# Patient Record
Sex: MALE | Race: BLACK OR AFRICAN AMERICAN | Employment: FULL TIME | URBAN - METROPOLITAN AREA
[De-identification: names, ages, dates, MRNs, and addresses within clinical notes are randomized per-mention and may not be internally consistent; named-entity substitution may affect disease eponyms.]

---

## 2017-11-29 ENCOUNTER — OFFICE VISIT (OUTPATIENT)
Dept: INTERNAL MEDICINE CLINIC | Age: 57
End: 2017-11-29

## 2017-11-29 VITALS
TEMPERATURE: 98.5 F | HEIGHT: 70 IN | WEIGHT: 230.3 LBS | OXYGEN SATURATION: 97 % | HEART RATE: 88 BPM | BODY MASS INDEX: 32.97 KG/M2 | RESPIRATION RATE: 20 BRPM | DIASTOLIC BLOOD PRESSURE: 90 MMHG | SYSTOLIC BLOOD PRESSURE: 176 MMHG

## 2017-11-29 DIAGNOSIS — I63.312 CEREBROVASCULAR ACCIDENT (CVA) DUE TO THROMBOSIS OF LEFT MIDDLE CEREBRAL ARTERY (HCC): ICD-10-CM

## 2017-11-29 DIAGNOSIS — E11.9 TYPE 2 DIABETES MELLITUS WITHOUT COMPLICATION, WITHOUT LONG-TERM CURRENT USE OF INSULIN (HCC): Primary | ICD-10-CM

## 2017-11-29 PROBLEM — I63.9 STROKE (HCC): Status: ACTIVE | Noted: 2017-11-29

## 2017-11-29 RX ORDER — GUAIFENESIN 100 MG/5ML
81 LIQUID (ML) ORAL DAILY
Qty: 100 TAB | Refills: 11 | Status: SHIPPED | OUTPATIENT
Start: 2017-11-29

## 2017-11-29 RX ORDER — METFORMIN HYDROCHLORIDE 500 MG/1
1000 TABLET, EXTENDED RELEASE ORAL 2 TIMES DAILY WITH MEALS
Qty: 120 TAB | Refills: 11 | Status: SHIPPED | OUTPATIENT
Start: 2017-11-29 | End: 2018-12-02 | Stop reason: SDUPTHER

## 2017-11-29 RX ORDER — LISINOPRIL AND HYDROCHLOROTHIAZIDE 10; 12.5 MG/1; MG/1
1 TABLET ORAL DAILY
Qty: 30 TAB | Refills: 11 | Status: SHIPPED | OUTPATIENT
Start: 2017-11-29 | End: 2018-01-04 | Stop reason: DRUGHIGH

## 2017-11-29 NOTE — MR AVS SNAPSHOT
Visit Information Date & Time Provider Department Dept. Phone Encounter #  
 11/29/2017  9:45 AM Darien Wilder MD [facility] Sports Medicine and Primary Care [phone] [id number] Follow-up Instructions Return in about 4 weeks (around 12/27/2017) for bp check, blood sugar check. Follow-up and Disposition History Upcoming Health Maintenance Date Due Hepatitis C Screening 1960 FOOT EXAM Q1 3/7/1968 MICROALBUMIN Q1 3/7/1968 EYE EXAM RETINAL OR DILATED Q1 3/7/1968 Pneumococcal 19-64 Medium Risk (1 of 1 - PPSV23) 3/7/1977 DTaP/Tdap/Td series (1 - Tdap) 3/7/1979 FOBT Q 1 YEAR AGE 50-75 3/7/2008 HEMOGLOBIN A1C Q6M 6/11/2015 LIPID PANEL Q1 12/11/2015 Allergies as of 11/29/2017  Review Complete On: 11/29/2017 By: Darien Wilder MD  
 No Known Allergies Current Immunizations  Never Reviewed No immunizations on file. Not reviewed this visit You Were Diagnosed With   
  
 Codes Comments Type 2 diabetes mellitus without complication, without long-term current use of insulin (HCC)    -  Primary ICD-10-CM: E11.9 ICD-9-CM: 250.00 Cerebrovascular accident (CVA) due to thrombosis of left middle cerebral artery ([facility]     ICD-10-CM: A96.568 ICD-9-CM: 434.01 Vitals BP Pulse Temp Resp Height(growth percentile) Weight(growth percentile) 176/90 88 98.5 °F (36.9 °C) (Oral) 20 5' 10\" (1.778 m) 230 lb 4.8 oz (104.5 kg) SpO2 BMI Smoking Status 97% 33.04 kg/m2 Never Smoker Vitals History BMI and BSA Data Body Mass Index Body Surface Area 33.04 kg/m 2 2.27 m 2 Preferred Pharmacy Pharmacy Name Phone [facility] [address] [facility] [phone] Your Updated Medication List  
  
   
This list is accurate as of: 11/29/17 11:45 AM.  Always use your most recent med list.  
  
  
  
  
 aspirin 81 mg chewable tablet Take 1 Tab by mouth daily. lisinopril-hydroCHLOROthiazide 10-12.5 mg per tablet Commonly known as:  Tyrel Bi Take 1 Tab by mouth daily. metFORMIN  mg tablet Commonly known as:  GLUCOPHAGE XR Take 2 Tabs by mouth two (2) times daily (with meals). Prescriptions Sent to Pharmacy Refills  
 aspirin 81 mg chewable tablet 11 Sig: Take 1 Tab by mouth daily. Class: Normal  
 Pharmacy: [facility] [address] [place] Ph #: [phone] Route: Oral  
 metFORMIN ER (GLUCOPHAGE XR) 500 mg tablet 11 Sig: Take 2 Tabs by mouth two (2) times daily (with meals). Class: Normal  
 Pharmacy: [facility] [address], [address] [place] Ph #: [phone] Route: Oral  
 lisinopril-hydroCHLOROthiazide (PRINZIDE, ZESTORETIC) 10-12.5 mg per tablet 11 Sig: Take 1 Tab by mouth daily. Class: Normal  
 Pharmacy: [facility] [address], [address] [place] Ph #: [phone] Route: Oral  
  
Follow-up Instructions Return in about 4 weeks (around 12/27/2017) for bp check, blood sugar check. Introducing [facility]! Carlo Leiva introduces Ring[employer] patient portal. Now you can access parts of your medical record, email your doctor's office, and request medication refills online. 1. In your internet browser, go to [email or web address]"[employer]. Gauzy/"[employer] 2. Click on the First Time User? Click Here link in the Sign In box. You will see the New Member Sign Up page. 3. Enter your Ringpay Access Code exactly as it appears below. You will not need to use this code after youve completed the sign-up process. If you do not sign up before the expiration date, you must request a new code. · Ring[employer] Access Code: [email or web address] Expires: 2/27/2018 10:52 AM 
 
4. Enter the last four digits of your Social Security Number (xxxx) and Date of Birth (mm/dd/yyyy) as indicated and click Submit. You will be taken to the next sign-up page. 5. Create a Soysuper ID. This will be your So[employer] login ID and cannot be changed, so think of one that is secure and easy to remember. 6. Create a So[employer] password. You can change your password at any time. 7. Enter your Password Reset Question and Answer. This can be used at a later time if you forget your password. 8. Enter your e-mail address. You will receive e-mail notification when new information is available in [address] 9. Click Sign Up. You can now view and download portions of your medical record. 10. Click the Download Summary menu link to download a portable copy of your medical information. If you have questions, please visit the Frequently Asked Questions section of the [employer] website. Remember, [employer] is NOT to be used for urgent needs. For medical emergencies, dial 911. Now available from your iPhone and Android! Please provide this summary of care documentation to your next provider. Your primary care clinician is listed as Barney Emmanuel. If you have any questions after today's visit, please call [phone].

## 2017-11-29 NOTE — PROGRESS NOTES
SPORTS MEDICINE AND PRIMARY CARE  Aylin Rockwell MD, [address] [address]  Phone:  [phone]  Fax: [phone]       Chief Complaint   Patient presents with    Blood sugar problem     f/u   . SUBJECTIVE:    Petra Brito is a 59 y.o. male Patient returns today after a long absence and is seen for evaluation. He has a known history of diabetes that has been uncontrolled, uncontrolled hypertension, and now complains of a month history of loss of balance and left sided weakness. He comes in with his wife of one week duration, Kirill Ansari , who works at [employer] as a DA. Patient is seen for evaluation. Current Outpatient Prescriptions   Medication Sig Dispense Refill    aspirin 81 mg chewable tablet Take 1 Tab by mouth daily. 100 Tab 11    metFORMIN ER (GLUCOPHAGE XR) 500 mg tablet Take 2 Tabs by mouth two (2) times daily (with meals). 120 Tab 11    lisinopril-hydroCHLOROthiazide (PRINZIDE, ZESTORETIC) 10-12.5 mg per tablet Take 1 Tab by mouth daily. 27 Tab 11     Past Medical History:   Diagnosis Date    DM (diabetes mellitus) ([facility]     Preventative health care     Right knee gives way     Scrotal wall abscess     Stroke ([facility] 11/29/2017    l hemiparesis     History reviewed. No pertinent surgical history.   No Known Allergies      REVIEW OF SYSTEMS:  General: negative for - chills or fever  ENT: negative for - headaches, nasal congestion or tinnitus  Respiratory: negative for - cough, hemoptysis, shortness of breath or wheezing  Cardiovascular : negative for - chest pain, edema, palpitations or shortness of breath  Gastrointestinal: negative for - abdominal pain, blood in stools, heartburn or nausea/vomiting  Genito-Urinary: no dysuria, trouble voiding, or hematuria  Musculoskeletal: negative for - gait disturbance, joint pain, joint stiffness or joint swelling  Neurological: no TIA or stroke symptoms  Hematologic: no bruises, no bleeding, no swollen glands  Integument: no lumps, mole changes, nail changes or rash  Endocrine: no malaise/lethargy or unexpected weight changes      Social History     Social History    Marital status:      Spouse name: N/A    Number of children: N/A    Years of education: N/A     Social History Main Topics    Smoking status: Never Smoker    Smokeless tobacco: Never Used    Alcohol use Yes      Comment: occassionally    Drug use: None    Sexual activity: Not Asked     Other Topics Concern    None     Social History Narrative    ** Merged History Encounter **     Habits: There is no history of cigarette abuse, occasional alcohol use. No drug abuse.           Social History:  Patient is  15 - Rachel Rai. He has no children and another person stays with him. 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         Family History:  Father  at 67 related to congestive heart failure. Mother was 76 and complications of diabetes. He has two brothers and one sister who are alive and well. Family History   Problem Relation Age of Onset    Diabetes Mother     Heart Disease Father        OBJECTIVE:    Visit Vitals    /90    Pulse 88    Temp 98.5 °F (36.9 °C) (Oral)    Resp 20    Ht 5' 10\" (1.778 m)    Wt 230 lb 4.8 oz (104.5 kg)    SpO2 97%    BMI 33.04 kg/m2     CONSTITUTIONAL: well , well nourished, appears age appropriate  EYES: perrla, eom intact  ENMT:moist mucous membranes, pharynx clear  NECK: supple. Thyroid normal  RESPIRATORY: Chest: clear bilaterally   CARDIOVASCULAR: Heart: regular rate and rhythm  GASTROINTESTINAL: Abdomen: soft, bowel sounds active  HEMATOLOGIC: no pathological lymph nodes palpated  MUSCULOSKELETAL: Extremities: no edema, pulse 1+   INTEGUMENT: No unusual rashes or suspicious skin lesions noted. Nails appear normal.  NEUROLOGIC: non-focal exam   MENTAL STATUS: alert and oriented, appropriate affect           ASSESSMENT:  1.  Type 2 diabetes mellitus without complication, without long-term current use of insulin ([facility]    2. Cerebrovascular accident (CVA) due to thrombosis of left middle cerebral artery ([facility]      The findings are consistent with acute CVA, probably occurring about a month ago with a left hemiparesis, the reason for his loss of balance. We offer intensive evaluation and hospitalization, all of which he declines. We do suggest an aspirin every day since he doesn't want a workup at this time because of insurance issues. We offer him a referral to [facility], as well as [facility], for evaluation to be accomplished, which he declines likewise. Blood sugar control is lacking. Will add Metformin and gradually titrate the dose to 2,000 mg a day. Will ask him to get the glucometer from [address] [place] [address] and their branded glucometer so it will be more affordable. We suggest he check his blood sugar once a day and to titrate the Metformin, beginning at 500 mg a day and gradually increasing up to 2,000 mg a day to induce a fasting blood sugar of less than 120. His blood pressure control is also lacking. Will start him on Lisinopril/HCTZ 10/12.5 and titrate the medication. He'll return to the office in one month for a blood sugar and blood pressure check, sooner if he has any problems. Again he is advised that if an acute situation occurs he can always go to [facility], and if he prefers he can go to [employer], where there is no charge. PLAN:  .  Orders Placed This Encounter    aspirin 81 mg chewable tablet    metFORMIN ER (GLUCOPHAGE XR) 500 mg tablet    lisinopril-hydroCHLOROthiazide (PRINZIDE, ZESTORETIC) 10-12.5 mg per tablet       Follow-up Disposition:  Return in about 4 weeks (around 12/27/2017) for bp check, blood sugar check. ATTENTION:   This medical record was transcribed using an electronic medical records system. Although proofread, it may and can contain electronic and spelling errors.   Other human spelling and other errors may be present. Corrections may be executed at a later time. Please feel free to contact us for any clarifications as needed.

## 2017-11-29 NOTE — PROGRESS NOTES
1. Have you been to the ER, urgent care clinic since your last visit? Hospitalized since your last visit? Yes When: 11-26-17 Reason for visit: numbness and tingling and elevated BP    2. Have you seen or consulted any other health care providers outside of the [address] [place] [address] since your last visit? Include any pap smears or colon screening. Yes Where: [facility]     Complaints of numbness and tingling in hands and legs. Wants to take diabetic medication.  vertigo

## 2018-01-04 ENCOUNTER — OFFICE VISIT (OUTPATIENT)
Dept: INTERNAL MEDICINE CLINIC | Age: 58
End: 2018-01-04

## 2018-01-04 VITALS
TEMPERATURE: 97.9 F | OXYGEN SATURATION: 95 % | RESPIRATION RATE: 18 BRPM | HEIGHT: 70 IN | SYSTOLIC BLOOD PRESSURE: 164 MMHG | DIASTOLIC BLOOD PRESSURE: 83 MMHG | WEIGHT: 235.3 LBS | HEART RATE: 85 BPM | BODY MASS INDEX: 33.69 KG/M2

## 2018-01-04 DIAGNOSIS — E11.9 TYPE 2 DIABETES MELLITUS WITHOUT COMPLICATION, WITHOUT LONG-TERM CURRENT USE OF INSULIN (HCC): Primary | ICD-10-CM

## 2018-01-04 DIAGNOSIS — I63.312 CEREBROVASCULAR ACCIDENT (CVA) DUE TO THROMBOSIS OF LEFT MIDDLE CEREBRAL ARTERY (HCC): ICD-10-CM

## 2018-01-04 RX ORDER — LISINOPRIL AND HYDROCHLOROTHIAZIDE 12.5; 2 MG/1; MG/1
1 TABLET ORAL DAILY
Qty: 90 TAB | Refills: 11 | Status: SHIPPED | OUTPATIENT
Start: 2018-01-04 | End: 2019-01-08 | Stop reason: SDUPTHER

## 2018-01-04 NOTE — PROGRESS NOTES
SPORTS MEDICINE AND PRIMARY CARE  Abena Art MD, [address] [address]  Phone:  [phone]  Fax: [phone]      Chief Complaint   Patient presents with    Hypertension     f/u         SUBECTIVE:    Jossie Mcmillan is a 59 y.o. male Patient returns today with known history of diabetes, uncontrolled, primary hypertension, CVA with left hemiparesis, seen for evaluation. Patient complains of numbness on the left side, which is the ipsilateral side of the stroke. His blood pressure . His blood sugars have been as low as 53, but usually in the 130-140 range. He's taking 8 units of insulin in the morning, 8 units before supper of the [employer] 70/30 from [place]. His blood pressure has also been running a little higher than we'd like to see it and therefore adjustment will need to be made. Other new complaints denied. Patient is seen for evaluation. Current Outpatient Prescriptions   Medication Sig Dispense Refill    lisinopril-hydroCHLOROthiazide (PRINZIDE, ZESTORETIC) 20-12.5 mg per tablet Take 1 Tab by mouth daily. 90 Tab 11    aspirin 81 mg chewable tablet Take 1 Tab by mouth daily. 100 Tab 11    metFORMIN ER (GLUCOPHAGE XR) 500 mg tablet Take 2 Tabs by mouth two (2) times daily (with meals). 120 Tab 11     Past Medical History:   Diagnosis Date    DM (diabetes mellitus) ([facility]     Preventative health care     Right knee gives way     Scrotal wall abscess     Stroke ([facility] 11/29/2017    l hemiparesis     History reviewed. No pertinent surgical history. No Known Allergies    REVIEW OF SYSTEMS:   No chest pain, no shortness of breath.         Social History     Social History    Marital status:      Spouse name: N/A    Number of children: N/A    Years of education: N/A     Social History Main Topics    Smoking status: Never Smoker    Smokeless tobacco: Never Used    Alcohol use Yes      Comment: occassionally    Drug use: No    Sexual activity: Yes     Partners: Female     Birth control/ protection: None     Other Topics Concern    None     Social History Narrative    ** Merged History Encounter **     Habits: There is no history of cigarette abuse, occasional alcohol use. No drug abuse.           Social History:  Patient is  15 - Golden Lucero. He has no children and another person stays with him. 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         Family History:  Father  at 67 related to congestive heart failure. Mother was 76 and complications of diabetes. He has two brothers and one sister who are alive and well.   r  Family History   Problem Relation Age of Onset    Diabetes Mother     Heart Disease Father        OBJECTIVE:  Visit Vitals    /83    Pulse 85    Temp 97.9 °F (36.6 °C) (Oral)    Resp 18    Ht 5' 10\" (1.778 m)    Wt 235 lb 4.8 oz (106.7 kg)    SpO2 95%    BMI 33.76 kg/m2     ENT: perrla,  eom intact  NECK: supple. Thyroid normal  CHEST: clear to ascultation and percussion   HEART: regular rate and rhythm  ABD: soft, bowel sounds active  EXTREMITIES: no edema, pulse 1+ Foot exam reveals asteatosis. No lesions are noted. Fine filament sensation is intact. Pulses are intact. No visits with results within 3 Month(s) from this visit.   Latest known visit with results is:    Office Visit on 2014   Component Date Value Ref Range Status    Specific Gravity 2014      >=1.030* 1.005 - 1.030 Final    pH (UA) 2014 6.0  5.0 - 7.5 Final    Color 2014 Yellow  Yellow Final    Appearance 2014 Clear  Clear Final    Leukocyte Esterase 2014 Negative  Negative Final    Protein 2014 1+* Negative/Trace Final    Glucose 2014 3+* Negative Final    Ketone 2014 Trace* Negative Final    Blood 2014 Negative  Negative Final    Bilirubin 2014 Negative  Negative Final    Urobilinogen 2014 0.2  0.0 - 1.9 mg/dL Final    Nitrites 2014 Negative  Negative Final    Microscopic Examination 12/11/2014 See additional order   Final    Microscopic was indicated and was performed.  WBC 12/11/2014 4.9  3.4 - 10.8 x10E3/uL Final    RBC 12/11/2014 5.93* 4.14 - 5.80 x10E6/uL Final    HGB 12/11/2014 13.7  12.6 - 17.7 g/dL Final    HCT 12/11/2014 44.6  37.5 - 51.0 % Final    MCV 12/11/2014 75* 79 - 97 fL Final    MCH 12/11/2014 23.1* 26.6 - 33.0 pg Final    MCHC 12/11/2014 30.7* 31.5 - 35.7 g/dL Final    RDW 12/11/2014 16.0* 12.3 - 15.4 % Final    PLATELET 71/10/2985 724  150 - 379 x10E3/uL Final    NEUTROPHILS 12/11/2014 61  % Final    Lymphocytes 12/11/2014 31  % Final    MONOCYTES 12/11/2014 7  % Final    EOSINOPHILS 12/11/2014 1  % Final    BASOPHILS 12/11/2014 0  % Final    ABS. NEUTROPHILS 12/11/2014 3.0  1.4 - 7.0 x10E3/uL Final    Abs Lymphocytes 12/11/2014 1.5  0.7 - 3.1 x10E3/uL Final    ABS. MONOCYTES 12/11/2014 0.3  0.1 - 0.9 x10E3/uL Final    ABS. EOSINOPHILS 12/11/2014 0.1  0.0 - 0.4 x10E3/uL Final    ABS. BASOPHILS 12/11/2014 0.0  0.0 - 0.2 x10E3/uL Final    IMMATURE GRANULOCYTES 12/11/2014 0  % Final    ABS. IMM. GRANS.  12/11/2014 0.0  0.0 - 0.1 x10E3/uL Final    Glucose 12/11/2014 236* 65 - 99 mg/dL Final    BUN 12/11/2014 13  6 - 24 mg/dL Final    Creatinine 12/11/2014 0.99  0.76 - 1.27 mg/dL Final    GFR est non-AA 12/11/2014 85  >59 mL/min/1.73 Final    GFR est AA 12/11/2014 98  >59 mL/min/1.73 Final    BUN/Creatinine ratio 12/11/2014 13  9 - 20 Final    Sodium 12/11/2014 140  134 - 144 mmol/L Final    Potassium 12/11/2014 4.4  3.5 - 5.2 mmol/L Final    Chloride 12/11/2014 98  97 - 108 mmol/L Final    CO2 12/11/2014 21  18 - 29 mmol/L Final    Calcium 12/11/2014 9.9  8.7 - 10.2 mg/dL Final    Protein, total 12/11/2014 7.3  6.0 - 8.5 g/dL Final    Albumin 12/11/2014 4.5  3.5 - 5.5 g/dL Final    GLOBULIN, TOTAL 12/11/2014 2.8  1.5 - 4.5 g/dL Final    A-G Ratio 12/11/2014 1.6  1.1 - 2.5 Final    Bilirubin, total 12/11/2014 0.3  0.0 - 1.2 mg/dL Final    Alk. phosphatase 12/11/2014 75  39 - 117 IU/L Final    AST (SGOT) 12/11/2014 20  0 - 40 IU/L Final    ALT (SGPT) 12/11/2014 36  0 - 44 IU/L Final    Cholesterol, total 12/11/2014 239* 100 - 199 mg/dL Final    Triglyceride 12/11/2014 161* 0 - 149 mg/dL Final    HDL Cholesterol 12/11/2014 55  >39 mg/dL Final    Comment: According to ATP-III Guidelines, HDL-C >59 mg/dL is considered a                           negative risk factor for CHD.  VLDL, calculated 12/11/2014 32  5 - 40 mg/dL Final    LDL, calculated 12/11/2014 152* 0 - 99 mg/dL Final    Prostate Specific Ag 12/11/2014 0.5  0.0 - 4.0 ng/mL Final    Comment: Roche ECLIA methodology. According to the American Urological Association, Serum PSA should                           decrease and remain at undetectable levels after radical                           prostatectomy. The AUA defines biochemical recurrence as an initial                           PSA value 0.2 ng/mL or greater followed by a subsequent confirmatory                           PSA value 0.2 ng/mL or greater. Values obtained with different assay methods or kits cannot be used                           interchangeably. Results cannot be interpreted as absolute evidence                           of the presence or absence of malignant disease.     C-Reactive Protein, Cardiac 12/11/2014 6.98* 0.00 - 3.00 mg/L Final    Comment:          Relative Risk for Future Cardiovascular Event                                                        Low                 <1.00                                                        Average       1.00 - 3.00                                                        High                >3.00    Apolipoprotein B 12/11/2014 126* 0 - 79 mg/dL Final    Hemoglobin A1c 12/11/2014 11.6* 4.8 - 5.6 % Final    Comment:          Increased risk for diabetes: 5.7 - 6.4                                    Diabetes: >6.4                                    Glycemic control for adults with diabetes: <7.0    Aerobic culture 12/11/2014 *  Final                    Value:Beta hemolytic Streptococcus, group B                          Light growth    Comment: Penicillin and ampicillin are drugs of choice for treatment of                           beta-hemolytic streptococcal infections. Susceptibility testing of                           penicillins and other beta-lactam agents approved by the FDA for                           treatment of beta-hemolytic streptococcal infections need not be                           performed routinely because nonsusceptible isolates are extremely                           rare in any beta-hemolytic streptococcus and have not been reported                           for Streptococcus pyogenes (group A). (CLSI 2011)    Aerobic culture 12/11/2014    Final                    Value:Mixed skin chepe                          Moderate growth    WBC 12/11/2014 0-5  0 - 5 /hpf Final    RBC 12/11/2014 0-2  0 - 2 /hpf Final    Epithelial cells 12/11/2014 None seen  0 - 10 /hpf Final    Casts 12/11/2014 Present* None seen /lpf Final    Cast type 12/11/2014 Comment  N/A Final    Hyaline casts    Mucus 12/11/2014 Present  Not Estab. Final    Bacteria 12/11/2014 Few  None seen/Few Final          ASSESSMENT:  1. Type 2 diabetes mellitus without complication, without long-term current use of insulin ([address]    2. Cerebrovascular accident (CVA) due to thrombosis of left middle cerebral artery ([address]      Compared to our last visit I believe we are on the correct pathway. Will increase Lisinopril to 20/12.5 daily. We will see if Victoza will be covered by insurance and if so we will stop the insulin, place him on Victoza and Metformin to keep blood sugar controlled.   If not, he'll increase Metformin to 1,500 mg a day and decrease the insulin to just in the morning. We continue to encourage physical activity, a heart healthy, weight reducing diet. We note that his BMI reflects [place] and New Year's. He'll return to the office in about a month for follow up. We will see him monthly until we have our numbers where we want them to be. PLAN:  .  Orders Placed This Encounter    URINALYSIS W/ RFLX MICROSCOPIC    CBC WITH AUTOMATED DIFF    METABOLIC PANEL, COMPREHENSIVE    LIPID PANEL    PROSTATE SPECIFIC AG    HEMOGLOBIN A1C WITH EAG    lisinopril-hydroCHLOROthiazide (PRINZIDE, ZESTORETIC) 20-12.5 mg per tablet       Follow-up Disposition:  Return in about 4 weeks (around 2/1/2018). ATTENTION:   This medical record was transcribed using an electronic medical records system. Although proofread, it may and can contain electronic and spelling errors. Other human spelling and other errors may be present. Corrections may be executed at a later time. Please feel free to contact us for any clarifications as needed.

## 2018-01-04 NOTE — PROGRESS NOTES
1. Have you been to the ER, urgent care clinic since your last visit? Hospitalized since your last visit? No    2. Have you seen or consulted any other health care providers outside of the [address] [place] [address] since your last visit? Include any pap smears or colon screening.  No     Wants to discuss medication

## 2018-01-04 NOTE — MR AVS SNAPSHOT
Visit Information Date & Time Provider Department Dept. Phone Encounter #  
 1/4/2018 12:45 PM Eddie Rhoades MD [facility] Sports Medicine and Primary Care [phone] [id number] Follow-up Instructions Return in about 4 weeks (around 2/1/2018). Follow-up and Disposition History Upcoming Health Maintenance Date Due Hepatitis C Screening 1960 FOOT EXAM Q1 3/7/1968 MICROALBUMIN Q1 3/7/1968 EYE EXAM RETINAL OR DILATED Q1 3/7/1968 COLONOSCOPY 3/7/1976 DTaP/Tdap/Td series (1 - Tdap) 3/7/1979 HEMOGLOBIN A1C Q6M 6/11/2015 LIPID PANEL Q1 12/11/2015 Allergies as of 1/4/2018  Review Complete On: 1/4/2018 By: Eddie Rhoades MD  
 No Known Allergies Current Immunizations  Never Reviewed No immunizations on file. Not reviewed this visit You Were Diagnosed With   
  
 Codes Comments Type 2 diabetes mellitus without complication, without long-term current use of insulin (HCC)    -  Primary ICD-10-CM: E11.9 ICD-9-CM: 250.00 Cerebrovascular accident (CVA) due to thrombosis of left middle cerebral artery ([facility] [address]     ICD-10-CM: G89.828 ICD-9-CM: 434.01 Vitals BP Pulse Temp Resp Height(growth percentile) Weight(growth percentile) 164/83 85 97.9 °F (36.6 °C) (Oral) 18 5' 10\" (1.778 m) 235 lb 4.8 oz (106.7 kg) SpO2 BMI Smoking Status 95% 33.76 kg/m2 Never Smoker Vitals History BMI and BSA Data Body Mass Index Body Surface Area 33.76 kg/m 2 2.3 m 2 Preferred Pharmacy Pharmacy Name Phone [address], [address] [address] [phone] Your Updated Medication List  
  
   
This list is accurate as of: 1/4/18  2:27 PM.  Always use your most recent med list.  
  
  
  
  
 aspirin 81 mg chewable tablet Take 1 Tab by mouth daily. lisinopril-hydroCHLOROthiazide 20-12.5 mg per tablet Commonly known as:  Kenyetta Common  
 Take 1 Tab by mouth daily. metFORMIN  mg tablet Commonly known as:  GLUCOPHAGE XR Take 2 Tabs by mouth two (2) times daily (with meals). Prescriptions Sent to Pharmacy Refills  
 lisinopril-hydroCHLOROthiazide (PRINZIDE, ZESTORETIC) 20-12.5 mg per tablet 11 Sig: Take 1 Tab by mouth daily. Class: Normal  
 Pharmacy: [address] [facility] [address] [facility] #: [phone] Route: Oral  
  
We Performed the Following CBC WITH AUTOMATED DIFF [26324 CPT(R)] HEMOGLOBIN A1C WITH EAG [82490 CPT(R)] LIPID PANEL [09475 CPT(R)] METABOLIC PANEL, COMPREHENSIVE [16473 CPT(R)] HI COLLECTION VENOUS BLOOD,VENIPUNCTURE D1217302 CPT(R)] PSA, DIAGNOSTIC (PROSTATE SPECIFIC AG) T2245701 CPT(R)] URINALYSIS W/ RFLX MICROSCOPIC [18680 CPT(R)] Follow-up Instructions Return in about 4 weeks (around 2/1/2018). Introducing [facility] & HEALTH SERVICES! [facility] introduces [employer] patient portal. Now you can access parts of your medical record, email your doctor's office, and request medication refills online. 1. In your internet browser, go to [email or web address] 2. Click on the First Time User? Click Here link in the Sign In box. You will see the New Member Sign Up page. 3. Enter your [employer] Access Code exactly as it appears below. You will not need to use this code after youve completed the sign-up process. If you do not sign up before the expiration date, you must request a new code. · [employer] Access Code: [email or web address] Expires: 2/27/2018 10:52 AM 
 
4. Enter the last four digits of your Social Security Number (xxxx) and Date of Birth (mm/dd/yyyy) as indicated and click Submit. You will be taken to the next sign-up page. 5. Create a [employer] ID. This will be your [employer] login ID and cannot be changed, so think of one that is secure and easy to remember. 6. Create a [employer] password. You can change your password at any time. 7. Enter your Password Reset Question and Answer. This can be used at a later time if you forget your password. 8. Enter your e-mail address. You will receive e-mail notification when new information is available in [address] 9. Click Sign Up. You can now view and download portions of your medical record. 10. Click the Download Summary menu link to download a portable copy of your medical information. If you have questions, please visit the Frequently Asked Questions section of the [employer] website. Remember, [employer] is NOT to be used for urgent needs. For medical emergencies, dial 911. Now available from your iPhone and Android! Please provide this summary of care documentation to your next provider. Your primary care clinician is listed as Bogdan Gil. If you have any questions after today's visit, please call [phone].

## 2018-01-05 DIAGNOSIS — I63.312 CEREBROVASCULAR ACCIDENT (CVA) DUE TO THROMBOSIS OF LEFT MIDDLE CEREBRAL ARTERY (HCC): Primary | ICD-10-CM

## 2018-01-05 LAB
ALBUMIN SERPL-MCNC: 4.5 G/DL (ref 3.5–5.5)
ALBUMIN/GLOB SERPL: 1.7 {RATIO} (ref 1.2–2.2)
ALP SERPL-CCNC: 63 IU/L (ref 39–117)
ALT SERPL-CCNC: 25 IU/L (ref 0–44)
APPEARANCE UR: CLEAR
AST SERPL-CCNC: 24 IU/L (ref 0–40)
BASOPHILS # BLD AUTO: 0 X10E3/UL (ref 0–0.2)
BASOPHILS NFR BLD AUTO: 1 %
BILIRUB SERPL-MCNC: 0.2 MG/DL (ref 0–1.2)
BILIRUB UR QL STRIP: NEGATIVE
BUN SERPL-MCNC: 10 MG/DL (ref 6–24)
BUN/CREAT SERPL: 10 (ref 9–20)
CALCIUM SERPL-MCNC: 10.1 MG/DL (ref 8.7–10.2)
CHLORIDE SERPL-SCNC: 97 MMOL/L (ref 96–106)
CHOLEST SERPL-MCNC: 223 MG/DL (ref 100–199)
CO2 SERPL-SCNC: 26 MMOL/L (ref 18–29)
COLOR UR: YELLOW
CREAT SERPL-MCNC: 1.01 MG/DL (ref 0.76–1.27)
EOSINOPHIL # BLD AUTO: 0.1 X10E3/UL (ref 0–0.4)
EOSINOPHIL NFR BLD AUTO: 2 %
ERYTHROCYTE [DISTWIDTH] IN BLOOD BY AUTOMATED COUNT: 16 % (ref 12.3–15.4)
EST. AVERAGE GLUCOSE BLD GHB EST-MCNC: 246 MG/DL
GLOBULIN SER CALC-MCNC: 2.6 G/DL (ref 1.5–4.5)
GLUCOSE SERPL-MCNC: 104 MG/DL (ref 65–99)
GLUCOSE UR QL: NEGATIVE
HBA1C MFR BLD: 10.2 % (ref 4.8–5.6)
HCT VFR BLD AUTO: 41.7 % (ref 37.5–51)
HDLC SERPL-MCNC: 56 MG/DL
HGB BLD-MCNC: 12.7 G/DL (ref 13–17.7)
HGB UR QL STRIP: NEGATIVE
IMM GRANULOCYTES # BLD: 0 X10E3/UL (ref 0–0.1)
IMM GRANULOCYTES NFR BLD: 0 %
KETONES UR QL STRIP: NEGATIVE
LDLC SERPL CALC-MCNC: 117 MG/DL (ref 0–99)
LEUKOCYTE ESTERASE UR QL STRIP: NEGATIVE
LYMPHOCYTES # BLD AUTO: 2.1 X10E3/UL (ref 0.7–3.1)
LYMPHOCYTES NFR BLD AUTO: 35 %
MCH RBC QN AUTO: 22.3 PG (ref 26.6–33)
MCHC RBC AUTO-ENTMCNC: 30.5 G/DL (ref 31.5–35.7)
MCV RBC AUTO: 73 FL (ref 79–97)
MICRO URNS: NORMAL
MONOCYTES # BLD AUTO: 0.5 X10E3/UL (ref 0.1–0.9)
MONOCYTES NFR BLD AUTO: 9 %
NEUTROPHILS # BLD AUTO: 3.2 X10E3/UL (ref 1.4–7)
NEUTROPHILS NFR BLD AUTO: 53 %
NITRITE UR QL STRIP: NEGATIVE
PH UR STRIP: 7 [PH] (ref 5–7.5)
PLATELET # BLD AUTO: 206 X10E3/UL (ref 150–379)
POTASSIUM SERPL-SCNC: 4.4 MMOL/L (ref 3.5–5.2)
PROT SERPL-MCNC: 7.1 G/DL (ref 6–8.5)
PROT UR QL STRIP: NEGATIVE
PSA SERPL-MCNC: 0.6 NG/ML (ref 0–4)
RBC # BLD AUTO: 5.69 X10E6/UL (ref 4.14–5.8)
SODIUM SERPL-SCNC: 143 MMOL/L (ref 134–144)
SP GR UR: 1.01 (ref 1–1.03)
TRIGL SERPL-MCNC: 248 MG/DL (ref 0–149)
UROBILINOGEN UR STRIP-MCNC: 0.2 MG/DL (ref 0.2–1)
VLDLC SERPL CALC-MCNC: 50 MG/DL (ref 5–40)
WBC # BLD AUTO: 6 X10E3/UL (ref 3.4–10.8)

## 2018-01-05 RX ORDER — ATORVASTATIN CALCIUM 40 MG/1
40 TABLET, FILM COATED ORAL DAILY
Qty: 30 TAB | Refills: 11 | Status: SHIPPED | OUTPATIENT
Start: 2018-01-05 | End: 2018-08-06 | Stop reason: SDUPTHER

## 2018-02-01 ENCOUNTER — OFFICE VISIT (OUTPATIENT)
Dept: INTERNAL MEDICINE CLINIC | Age: 58
End: 2018-02-01

## 2018-02-01 VITALS
WEIGHT: 231.7 LBS | HEIGHT: 70 IN | HEART RATE: 86 BPM | OXYGEN SATURATION: 97 % | BODY MASS INDEX: 33.17 KG/M2 | DIASTOLIC BLOOD PRESSURE: 71 MMHG | SYSTOLIC BLOOD PRESSURE: 128 MMHG | TEMPERATURE: 98.2 F | RESPIRATION RATE: 20 BRPM

## 2018-02-01 DIAGNOSIS — E11.9 TYPE 2 DIABETES MELLITUS WITHOUT COMPLICATION, WITHOUT LONG-TERM CURRENT USE OF INSULIN (HCC): Primary | ICD-10-CM

## 2018-02-01 DIAGNOSIS — Z12.11 SCREEN FOR COLON CANCER: ICD-10-CM

## 2018-02-01 DIAGNOSIS — I63.312 CEREBROVASCULAR ACCIDENT (CVA) DUE TO THROMBOSIS OF LEFT MIDDLE CEREBRAL ARTERY (HCC): ICD-10-CM

## 2018-02-01 NOTE — PROGRESS NOTES
1. Have you been to the ER, urgent care clinic since your last visit? Hospitalized since your last visit? No    2. Have you seen or consulted any other health care providers outside of the [address] [place] [address] since your last visit? Include any pap smears or colon screening. No     Complaints of unsteady gait,  Dizziness, and low energy.  Wants to discuss disability

## 2018-02-01 NOTE — PROGRESS NOTES
SPORTS MEDICINE AND PRIMARY CARE  Kenroy Sen MD, [address] [address]  Phone:  [phone]  Fax: [phone]      Chief Complaint   Patient presents with    Diabetes     f/u         SUBECTIVE:    Anali Pedersen is a 59 y.o. male Patient returns today with known history of CVA, dyslipidemia and diabetes and is seen for evaluation. He didn't bring his sugar readings in but tells me his sugars range from 100-140 since we last saw him and his blood pressures are in a normotensive range, however he tells me he doesn't think he can go back to work. He doesn't feel comfortable going back to work, feels off balance he states. Current Outpatient Prescriptions   Medication Sig Dispense Refill    atorvastatin (LIPITOR) 40 mg tablet Take 1 Tab by mouth daily. 30 Tab 11    lisinopril-hydroCHLOROthiazide (PRINZIDE, ZESTORETIC) 20-12.5 mg per tablet Take 1 Tab by mouth daily. 90 Tab 11    aspirin 81 mg chewable tablet Take 1 Tab by mouth daily. 100 Tab 11    metFORMIN ER (GLUCOPHAGE XR) 500 mg tablet Take 2 Tabs by mouth two (2) times daily (with meals). 120 Tab 11     Past Medical History:   Diagnosis Date    DM (diabetes mellitus) ([facility] [address]     Preventative health care     Right knee gives way     Scrotal wall abscess     Stroke ([facility] [address] 11/29/2017    l hemiparesis     History reviewed. No pertinent surgical history. No Known Allergies    REVIEW OF SYSTEMS:   No falls, no chest pain, no shortness of breath.         Social History     Social History    Marital status:      Spouse name: N/A    Number of children: N/A    Years of education: N/A     Social History Main Topics    Smoking status: Never Smoker    Smokeless tobacco: Never Used    Alcohol use Yes      Comment: occassionally    Drug use: No    Sexual activity: Yes     Partners: Female     Birth control/ protection: None     Other Topics Concern    None     Social History Narrative    ** Merged History Encounter **     Habits: There is no history of cigarette abuse, occasional alcohol use. No drug abuse.           Social History:  Patient is  15 - Leeanne Sharma. He has no children and another person stays with him. 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         Family History:  Father  at 67 related to congestive heart failure. Mother was 76 and complications of diabetes. He has two brothers and one sister who are alive and well.   r  Family History   Problem Relation Age of Onset    Diabetes Mother     Heart Disease Father        OBJECTIVE:  Visit Vitals    /71    Pulse 86    Temp 98.2 °F (36.8 °C) (Oral)    Resp 20    Ht 5' 10\" (1.778 m)    Wt 231 lb 11.2 oz (105.1 kg)    SpO2 97%    BMI 33.25 kg/m2     ENT: perrla,  eom intact  NECK: supple. Thyroid normal  CHEST: clear to ascultation and percussion   HEART: regular rate and rhythm  ABD: soft, bowel sounds active  EXTREMITIES: no edema, pulse 1+     Office Visit on 2018   Component Date Value Ref Range Status    Specific Gravity 2018 1.012  1.005 - 1.030 Final    pH (UA) 2018 7.0  5.0 - 7.5 Final    Color 2018 Yellow  Yellow Final    Appearance 2018 Clear  Clear Final    Leukocyte Esterase 2018 Negative  Negative Final    Protein 2018 Negative  Negative/Trace Final    Glucose 2018 Negative  Negative Final    Ketone 2018 Negative  Negative Final    Blood 2018 Negative  Negative Final    Bilirubin 2018 Negative  Negative Final    Urobilinogen 2018 0.2  0.2 - 1.0 mg/dL Final    Nitrites 2018 Negative  Negative Final    Microscopic Examination 2018 Comment   Final    Microscopic not indicated and not performed.     WBC 2018 6.0  3.4 - 10.8 x10E3/uL Final    RBC 2018 5.69  4.14 - 5.80 x10E6/uL Final    HGB 2018 12.7* 13.0 - 17.7 g/dL Final    HCT 2018 41.7  37.5 - 51.0 % Final    MCV 2018 73* 79 - 97 fL Final    MCH 01/04/2018 22.3* 26.6 - 33.0 pg Final    MCHC 01/04/2018 30.5* 31.5 - 35.7 g/dL Final    RDW 01/04/2018 16.0* 12.3 - 15.4 % Final    PLATELET 77/43/4286 503  150 - 379 x10E3/uL Final    NEUTROPHILS 01/04/2018 53  Not Estab. % Final    Lymphocytes 01/04/2018 35  Not Estab. % Final    MONOCYTES 01/04/2018 9  Not Estab. % Final    EOSINOPHILS 01/04/2018 2  Not Estab. % Final    BASOPHILS 01/04/2018 1  Not Estab. % Final    ABS. NEUTROPHILS 01/04/2018 3.2  1.4 - 7.0 x10E3/uL Final    Abs Lymphocytes 01/04/2018 2.1  0.7 - 3.1 x10E3/uL Final    ABS. MONOCYTES 01/04/2018 0.5  0.1 - 0.9 x10E3/uL Final    ABS. EOSINOPHILS 01/04/2018 0.1  0.0 - 0.4 x10E3/uL Final    ABS. BASOPHILS 01/04/2018 0.0  0.0 - 0.2 x10E3/uL Final    IMMATURE GRANULOCYTES 01/04/2018 0  Not Estab. % Final    ABS. IMM. GRANS. 01/04/2018 0.0  0.0 - 0.1 x10E3/uL Final    Glucose 01/04/2018 104* 65 - 99 mg/dL Final    BUN 01/04/2018 10  6 - 24 mg/dL Final    Creatinine 01/04/2018 1.01  0.76 - 1.27 mg/dL Final    GFR est non-AA 01/04/2018 81  >59 mL/min/1.73 Final    GFR est AA 01/04/2018 94  >59 mL/min/1.73 Final    BUN/Creatinine ratio 01/04/2018 10  9 - 20 Final    Sodium 01/04/2018 143  134 - 144 mmol/L Final    Potassium 01/04/2018 4.4  3.5 - 5.2 mmol/L Final    Chloride 01/04/2018 97  96 - 106 mmol/L Final    CO2 01/04/2018 26  18 - 29 mmol/L Final    Calcium 01/04/2018 10.1  8.7 - 10.2 mg/dL Final    Protein, total 01/04/2018 7.1  6.0 - 8.5 g/dL Final    Albumin 01/04/2018 4.5  3.5 - 5.5 g/dL Final    GLOBULIN, TOTAL 01/04/2018 2.6  1.5 - 4.5 g/dL Final    A-G Ratio 01/04/2018 1.7  1.2 - 2.2 Final    Bilirubin, total 01/04/2018 0.2  0.0 - 1.2 mg/dL Final    Alk.  phosphatase 01/04/2018 63  39 - 117 IU/L Final    AST (SGOT) 01/04/2018 24  0 - 40 IU/L Final    ALT (SGPT) 01/04/2018 25  0 - 44 IU/L Final    Cholesterol, total 01/04/2018 223* 100 - 199 mg/dL Final    Triglyceride 01/04/2018 248* 0 - 149 mg/dL Final    HDL Cholesterol 01/04/2018 56  >39 mg/dL Final    VLDL, calculated 01/04/2018 50* 5 - 40 mg/dL Final    LDL, calculated 01/04/2018 117* 0 - 99 mg/dL Final    Prostate Specific Ag 01/04/2018 0.6  0.0 - 4.0 ng/mL Final    Comment: Roche ECLIA methodology. According to the American Urological Association, Serum PSA should  decrease and remain at undetectable levels after radical  prostatectomy. The AUA defines biochemical recurrence as an initial  PSA value 0.2 ng/mL or greater followed by a subsequent confirmatory  PSA value 0.2 ng/mL or greater. Values obtained with different assay methods or kits cannot be used  interchangeably. Results cannot be interpreted as absolute evidence  of the presence or absence of malignant disease.  Hemoglobin A1c 01/04/2018 10.2* 4.8 - 5.6 % Final    Comment:          Pre-diabetes: 5.7 - 6.4           Diabetes: >6.4           Glycemic control for adults with diabetes: <7.0      Estimated average glucose 01/04/2018 246  mg/dL Final          ASSESSMENT:  1. Type 2 diabetes mellitus without complication, without long-term current use of insulin ([address]    2. Cerebrovascular accident (CVA) due to thrombosis of left middle cerebral artery ([facility] [address]    3. Screen for colon cancer      We encouraged him to start walking, maybe doing 15 minutes a day and gradually increase to 30 minutes a day. He initially told me he could at least walk 15 minutes a day. He has a great desire to apply for disability and we suggest he can probably begin physical activity and resume work. However he feels uncomfortable he states and feels off balance. By his history, diabetes control is improved, as well as blood pressure. He'll return to the office in two months. PLAN:  .  Orders Placed This Encounter    HEPATITIS C AB    REFERRAL FOR COLONOSCOPY       Follow-up Disposition:  Return in about 3 months (around 5/1/2018).       ATTENTION:   This medical record was transcribed using an electronic medical records system. Although proofread, it may and can contain electronic and spelling errors. Other human spelling and other errors may be present. Corrections may be executed at a later time. Please feel free to contact us for any clarifications as needed.

## 2018-02-01 NOTE — MR AVS SNAPSHOT
[address] [place] [address]. [address] [id number] 
[phone] Patient: Creola Severs MRN: [id number] TYB:5/6/1999 Visit Information Date & Time Provider Department Dept. Phone Encounter #  
 2/1/2018 12:45 PM Noe Kelly MD [facility] Sports Medicine and Primary Care [phone] [id number] Follow-up Instructions Return in about 3 months (around 5/1/2018). Upcoming Health Maintenance Date Due Hepatitis C Screening 1960 FOOT EXAM Q1 3/7/1968 MICROALBUMIN Q1 3/7/1968 COLONOSCOPY 3/7/1976 HEMOGLOBIN A1C Q6M 7/4/2018 LIPID PANEL Q1 1/4/2019 DTaP/Tdap/Td series (2 - Td) 2/1/2028 Allergies as of 2/1/2018  Review Complete On: 2/1/2018 By: Sanna Bashir LPN No Known Allergies Current Immunizations  Never Reviewed No immunizations on file. Not reviewed this visit You Were Diagnosed With   
  
 Codes Comments Type 2 diabetes mellitus without complication, without long-term current use of insulin (HCC)    -  Primary ICD-10-CM: E11.9 ICD-9-CM: 250.00 Cerebrovascular accident (CVA) due to thrombosis of left middle cerebral artery ([facility] [address]     ICD-10-CM: P89.564 ICD-9-CM: 434.01 Screen for colon cancer     ICD-10-CM: Z12.11 ICD-9-CM: V76.51 Vitals BP Pulse Temp Resp Height(growth percentile) Weight(growth percentile) 151/84 86 98.2 °F (36.8 °C) (Oral) 20 5' 10\" (1.778 m) 231 lb 11.2 oz (105.1 kg) SpO2 BMI Smoking Status 97% 33.25 kg/m2 Never Smoker Vitals History BMI and BSA Data Body Mass Index Body Surface Area  
 33.25 kg/m 2 2.28 m 2 Preferred Pharmacy Pharmacy Name Phone [facility] [address], [address] [phone] Your Updated Medication List  
  
   
This list is accurate as of: 2/1/18  2:56 PM.  Always use your most recent med list.  
  
  
  
  
 aspirin 81 mg chewable tablet Take 1 Tab by mouth daily. atorvastatin 40 mg tablet Commonly known as:  LIPITOR Take 1 Tab by mouth daily. lisinopril-hydroCHLOROthiazide 20-12.5 mg per tablet Commonly known as:  Kerrie Klippel Take 1 Tab by mouth daily. metFORMIN  mg tablet Commonly known as:  GLUCOPHAGE XR Take 2 Tabs by mouth two (2) times daily (with meals). We Performed the Following HEPATITIS C AB [36393 CPT(R)] HM DIABETES FOOT EXAM [HM7 Custom] REFERRAL FOR COLONOSCOPY [UBF030 Custom] Comments:  
 Please evaluate patient for colon. Follow-up Instructions Return in about 3 months (around 5/1/2018). Referral Information Referral ID Referred By Referred To  
  
 [id number] Leonora Morton MD   
   [address] [facility] [address] [place], [place] Phone: [phone] Fax: [phone] Visits Status Start Date End Date 1 New Request 2/1/18 2/1/19 If your referral has a status of pending review or denied, additional information will be sent to support the outcome of this decision. Introducing [facility]! Jermaine Thayer introduces [employer] patient portal. Now you can access parts of your medical record, email your doctor's office, and request medication refills online. 1. In your internet browser, go to [email or web address] 2. Click on the First Time User? Click Here link in the Sign In box. You will see the New Member Sign Up page. 3. Enter your [employer] Access Code exactly as it appears below. You will not need to use this code after youve completed the sign-up process. If you do not sign up before the expiration date, you must request a new code. · [employer] Access Code: [email or web address] Expires: 2/27/2018 10:52 AM 
 
4. Enter the last four digits of your Social Security Number (xxxx) and Date of Birth (mm/dd/yyyy) as indicated and click Submit.  You will be taken to the next sign-up page. 5. Create a [employer] ID. This will be your [employer] login ID and cannot be changed, so think of one that is secure and easy to remember. 6. Create a [employer] password. You can change your password at any time. 7. Enter your Password Reset Question and Answer. This can be used at a later time if you forget your password. 8. Enter your e-mail address. You will receive e-mail notification when new information is available in [address] 9. Click Sign Up. You can now view and download portions of your medical record. 10. Click the Download Summary menu link to download a portable copy of your medical information. If you have questions, please visit the Frequently Asked Questions section of the [employer] website. Remember, [employer] is NOT to be used for urgent needs. For medical emergencies, dial 911. Now available from your iPhone and Android! Please provide this summary of care documentation to your next provider. Your primary care clinician is listed as Maria De Jesus Jose. If you have any questions after today's visit, please call [phone].

## 2018-02-02 LAB — HCV AB S/CO SERPL IA: <0.1 S/CO RATIO (ref 0–0.9)

## 2018-05-10 ENCOUNTER — OFFICE VISIT (OUTPATIENT)
Dept: INTERNAL MEDICINE CLINIC | Age: 58
End: 2018-05-10

## 2018-05-10 VITALS
RESPIRATION RATE: 18 BRPM | SYSTOLIC BLOOD PRESSURE: 129 MMHG | TEMPERATURE: 97.8 F | HEART RATE: 79 BPM | DIASTOLIC BLOOD PRESSURE: 76 MMHG | HEIGHT: 70 IN | WEIGHT: 232.8 LBS | BODY MASS INDEX: 33.33 KG/M2 | OXYGEN SATURATION: 97 %

## 2018-05-10 DIAGNOSIS — Z12.11 SCREEN FOR COLON CANCER: ICD-10-CM

## 2018-05-10 DIAGNOSIS — D64.9 ANEMIA, UNSPECIFIED TYPE: ICD-10-CM

## 2018-05-10 DIAGNOSIS — I63.312 CEREBROVASCULAR ACCIDENT (CVA) DUE TO THROMBOSIS OF LEFT MIDDLE CEREBRAL ARTERY (HCC): ICD-10-CM

## 2018-05-10 DIAGNOSIS — E11.9 TYPE 2 DIABETES MELLITUS WITHOUT COMPLICATION, WITHOUT LONG-TERM CURRENT USE OF INSULIN (HCC): Primary | ICD-10-CM

## 2018-05-10 NOTE — PROGRESS NOTES
SPORTS MEDICINE AND PRIMARY CARE  Cindi Munoz MD, Chelsi Torres[address] [address]  Phone:  [phone]  Fax: [phone]       Chief Complaint   Patient presents with    Diabetes     f/u   . SUBJECTIVE:    Marva Hatch is a 59 y.o. male Patient returns today with known history of primary hypertension, anemia, diabetes, CVA with left hemiparesis, and is seen for evaluation. 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 He was a little anxious at first, but now is back into his groove. Even before the long absence  stroke he had the attitude that \"you never know what's going to happen\". He still has that attitude. Other specific complaints denied. Patient is seen for evaluation. He wonders about his blood sugars being in the low 100s, still on 8 units of insulin. Current Outpatient Prescriptions   Medication Sig Dispense Refill    insulin NPH/insulin regular (NOVOLIN 70/30, HUMULIN 70/30) 100 unit/mL (70-30) injection 8 Units by SubCUTAneous route Daily (before breakfast). 10 mL 11    atorvastatin (LIPITOR) 40 mg tablet Take 1 Tab by mouth daily. 30 Tab 11    lisinopril-hydroCHLOROthiazide (PRINZIDE, ZESTORETIC) 20-12.5 mg per tablet Take 1 Tab by mouth daily. 90 Tab 11    aspirin 81 mg chewable tablet Take 1 Tab by mouth daily. 100 Tab 11    metFORMIN ER (GLUCOPHAGE XR) 500 mg tablet Take 2 Tabs by mouth two (2) times daily (with meals). 120 Tab 11     Past Medical History:   Diagnosis Date    Anemia     DM (diabetes mellitus) ([address]     HTN (hypertension)     Preventative health care     Right knee gives way     Scrotal wall abscess     Stroke ([facility] 11/29/2017    l hemiparesis     History reviewed. No pertinent surgical history.   No Known Allergies      REVIEW OF SYSTEMS:  General: negative for - chills or fever  ENT: negative for - headaches, nasal congestion or tinnitus  Respiratory: negative for - cough, hemoptysis, shortness of breath or wheezing  Cardiovascular : negative for - chest pain, edema, palpitations or shortness of breath  Gastrointestinal: negative for - abdominal pain, blood in stools, heartburn or nausea/vomiting  Genito-Urinary: no dysuria, trouble voiding, or hematuria  Musculoskeletal: negative for - gait disturbance, joint pain, joint stiffness or joint swelling  Neurological: no TIA or stroke symptoms  Hematologic: no bruises, no bleeding, no swollen glands  Integument: no lumps, mole changes, nail changes or rash  Endocrine: no malaise/lethargy or unexpected weight changes      Social History     Social History    Marital status:      Spouse name: N/A    Number of children: N/A    Years of education: N/A     Social History Main Topics    Smoking status: Never Smoker    Smokeless tobacco: Never Used    Alcohol use No      Comment: occassionally    Drug use: No    Sexual activity: Yes     Partners: Female     Birth control/ protection: None     Other Topics Concern    None     Social History Narrative    ** Merged History Encounter **     Habits: There is no history of cigarette abuse, occasional alcohol use. No drug abuse.           Social History:  Patient is  15 - [employer] [employer] [facility]. He has no children and another person stays with him. 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 Mother was 76 and complications of diabetes. He has two brothers and one sister who are alive and well.      Family History   Problem Relation Age of Onset    Diabetes Mother     Heart Disease Father        OBJECTIVE:    Visit Vitals    /79    Pulse 79    Temp 97.8 °F (36.6 °C) (Oral)    Resp 18    Ht 5' 10\" (1.778 m)    Wt 232 lb 12.8 oz (105.6 kg)    SpO2 97%    BMI 33.4 kg/m2     CONSTITUTIONAL: well , well nourished, appears age appropriate  EYES: perrla, eom intact  ENMT:moist mucous membranes, pharynx clear  NECK: supple. Thyroid normal  RESPIRATORY: Chest: clear bilaterally   CARDIOVASCULAR: Heart: regular rate and rhythm  GASTROINTESTINAL: Abdomen: soft, bowel sounds active  HEMATOLOGIC: no pathological lymph nodes palpated  MUSCULOSKELETAL: Extremities: no edema, pulse 1+   INTEGUMENT: No unusual rashes or suspicious skin lesions noted. Nails appear normal.  NEUROLOGIC: non-focal exam   MENTAL STATUS: alert and oriented, appropriate affect           ASSESSMENT:  1. Type 2 diabetes mellitus without complication, without long-term current use of insulin ([address]    2. Cerebrovascular accident (CVA) due to thrombosis of left middle cerebral artery ([address]    3. Anemia, unspecified type    4. Screen for colon cancer      As discussed above patient is concerned about blood sugars being close to 100 and doesn't want them to bottom out. We suggest he look at his Hgb A1c when it is obtained. If it's less than 6.8 he should stop insulin, if it's between 6.8 or above he could decrease the dose by 2 units. Patient agrees with the plan. He continues on Metformin as noted above. We continue to exalt him and congratulate him on the return to work, however since he's returned to work he's stopped his exercise program.  We ask him to get back to his exercise program for at least 30 minutes five days a week. He has typical white coat hypertension. At home his blood pressure this morning was 128/78 compared to the blood pressure today. He agrees to have the colonoscopy. Since he works at [employer] will ask Dr. Romeo Khalil to perform the study. He'll return to the office in three months, sooner if he has any problems. Discussed the patient's BMI with him.   The BMI follow up plan is as follows:     dietary management education, guidance, and counseling  encourage exercise  monitor weight  prescribed dietary intake    An After Visit Summary was printed and given to the patient. PLAN:  .  Orders Placed This Encounter    MICROALBUMIN, UR, RAND W/ MICROALB/CREAT RATIO    CBC WITH AUTOMATED DIFF    LIPID PANEL    HEMOGLOBIN A1C WITH EAG    REFERRAL FOR COLONOSCOPY    insulin NPH/insulin regular (NOVOLIN 70/30, HUMULIN 70/30) 100 unit/mL (70-30) injection       Follow-up Disposition:  Return in about 3 months (around 8/10/2018). ATTENTION:   This medical record was transcribed using an electronic medical records system. Although proofread, it may and can contain electronic and spelling errors. Other human spelling and other errors may be present. Corrections may be executed at a later time. Please feel free to contact us for any clarifications as needed.

## 2018-05-10 NOTE — PROGRESS NOTES
1. Have you been to the ER, urgent care clinic since your last visit? Hospitalized since your last visit? No    2. Have you seen or consulted any other health care providers outside of the [address] [place] [address] since your last visit? Include any pap smears or colon screening.  No       Wants to discuss his BS numbers

## 2018-05-10 NOTE — MR AVS SNAPSHOT
[facility] 
 
 
 [id number] 
[phone] Patient: Lux Canas MRN: [id number] Visit Information Date & Time Provider Department Dept. Phone Encounter #  
 5/10/2018 10:00 AM [facility] [id number] Follow-up Instructions Return in about 3 months (around 8/10/2018). Follow-up and Disposition History Your Appointments 2018 10:45 AM  
Any with Afua Suarez MD  
[address] [place] [address] and Primary Care [address] [place] [address]) Appt Note: 3 MONTH FOLLOW UP  
 [address] [facility]  
  
   
 [id number] Upcoming Health Maintenance Date Due MICROALBUMIN Q1 3/7/1968 COLONOSCOPY 3/7/1976 HEMOGLOBIN A1C Q6M 2018 Influenza Age 5 to Adult 2018 LIPID PANEL Q1 2019 FOOT EXAM Q1 2019 DTaP/Tdap/Td series (2 - Td) 2028 Allergies as of 5/10/2018  Review Complete On: 5/10/2018 By: Afua Suarez MD  
 No Known Allergies Current Immunizations  Never Reviewed No immunizations on file. Not reviewed this visit You Were Diagnosed With   
  
 Codes Comments Type 2 diabetes mellitus without complication, without long-term current use of insulin (HCC)    -  Primary ICD-10-CM: E11.9 ICD-9-CM: 250.00 Cerebrovascular accident (CVA) due to thrombosis of left middle cerebral artery ([facility]     ICD-10-CM: F11.589 ICD-9-CM: 434.01 Anemia, unspecified type     ICD-10-CM: D64.9 ICD-9-CM: 285.9 Screen for colon cancer     ICD-10-CM: Z12.11 ICD-9-CM: V76.51 Vitals BP Pulse Temp Resp Height(growth percentile) Weight(growth percentile) 161/79 79 97.8 °F (36.6 °C) (Oral) 18 5' 10\" (1.778 m) 232 lb 12.8 oz (105.6 kg) SpO2 BMI Smoking Status 97% 33.4 kg/m2 Never Smoker Vitals History BMI and BSA Data Body Mass Index Body Surface Area  
 33.4 kg/m 2 2.28 m 2 Preferred Pharmacy Pharmacy Name Phone [facility], [address] [facility] [phone] Your Updated Medication List  
  
   
This list is accurate as of 5/10/18 12:02 PM.  Always use your most recent med list.  
  
  
  
  
 aspirin 81 mg chewable tablet Take 1 Tab by mouth daily. atorvastatin 40 mg tablet Commonly known as:  LIPITOR Take 1 Tab by mouth daily. insulin NPH/insulin regular 100 unit/mL (70-30) injection Commonly known as:  NOVOLIN 70/30, HUMULIN 70/30  
8 Units by SubCUTAneous route Daily (before breakfast). lisinopril-hydroCHLOROthiazide 20-12.5 mg per tablet Commonly known as:  Giancarlo Lash Take 1 Tab by mouth daily. metFORMIN  mg tablet Commonly known as:  GLUCOPHAGE XR Take 2 Tabs by mouth two (2) times daily (with meals). We Performed the Following CBC WITH AUTOMATED DIFF [86697 CPT(R)] COLLECTION VENOUS BLOOD,VENIPUNCTURE Z9313235 CPT(R)] HEMOGLOBIN A1C WITH EAG [81459 CPT(R)] LIPID PANEL [64077 CPT(R)] MICROALBUMIN, UR, RAND W/ MICROALB/CREAT RATIO [id number] CPT(R)] REFERRAL FOR COLONOSCOPY [UFA994 Custom] Comments:  
 Please evaluate patient for colon. Follow-up Instructions Return in about 3 months (around 8/10/2018). Referral Information Referral ID Referred By Referred To  
  
 [id number] Carmina Luciano MD   
   [address] [place] [address] Phone: [phone] Fax: [phone] Visits Status Start Date End Date 1 New Request 5/10/18 5/10/19 If your referral has a status of pending review or denied, additional information will be sent to support the outcome of this decision. Patient Instructions Body Mass Index: Care Instructions Your Care Instructions Body mass index (BMI) can help you see if your weight is raising your risk for health problems. It uses a formula to compare how much you weigh with how tall you are. · A BMI lower than 18.5 is considered underweight. · A BMI between 18.5 and 24.9 is considered healthy. · A BMI between 25 and 29.9 is considered overweight. A BMI of 30 or higher is considered obese. If your BMI is in the normal range, it means that you have a lower risk for weight-related health problems. If your BMI is in the overweight or obese range, you may be at increased risk for weight-related health problems, such as high blood pressure, heart disease, stroke, arthritis or joint pain, and diabetes. If your BMI is in the underweight range, you may be at increased risk for health problems such as fatigue, lower protection (immunity) against illness, muscle loss, bone loss, hair loss, and hormone problems. BMI is just one measure of your risk for weight-related health problems. You may be at higher risk for health problems if you are not active, you eat an unhealthy diet, or you drink too much alcohol or use tobacco products. Follow-up care is a key part of your treatment and safety. Be sure to make and go to all appointments, and call your doctor if you are having problems. It's also a good idea to know your test results and keep a list of the medicines you take. How can you care for yourself at home? · Practice healthy eating habits. This includes eating plenty of fruits, vegetables, whole grains, lean protein, and low-fat dairy. · If your doctor recommends it, get more exercise. Walking is a good choice. Bit by bit, increase the amount you walk every day. Try for at least 30 minutes on most days of the week. · Do not smoke. Smoking can increase your risk for health problems.  If you need help quitting, talk to your doctor about stop-smoking programs and medicines. These can increase your chances of quitting for good. · Limit alcohol to 2 drinks a day for men and 1 drink a day for women. Too much alcohol can cause health problems. If you have a BMI higher than 25 · Your doctor may do other tests to check your risk for weight-related health problems. This may include measuring the distance around your waist. A waist measurement of more than 40 inches in men or 35 inches in women can increase the risk of weight-related health problems. · Talk with your doctor about steps you can take to stay healthy or improve your health. You may need to make lifestyle changes to lose weight and stay healthy, such as changing your diet and getting regular exercise. If you have a BMI lower than 18.5 · Your doctor may do other tests to check your risk for health problems. · Talk with your doctor about steps you can take to stay healthy or improve your health. You may need to make lifestyle changes to gain or maintain weight and stay healthy, such as getting more healthy foods in your diet and doing exercises to build muscle. Where can you learn more? Go to http://jhonny-amari.info/. Enter S176 in the search box to learn more about \"Body Mass Index: Care Instructions. \" Current as of: October 13, 2016 Content Version: 11.4 © 4892-5865 Healthwise, Incorporated. Care instructions adapted under license by [employer] (which disclaims liability or warranty for this information). If you have questions about a medical condition or this instruction, always ask your healthcare professional. [address] any warranty or liability for your use of this information. Introducing \[facility] [place] [facility]\"" & HEALTH SERVICES! Judy Ca introduces [employer]. patient portal. Now you can access parts of your medical record, email your doctor's office, and request medication refills online.    
 
1. In your internet browser, go to [email or web address] 2. Click on the First Time User? Click Here link in the Sign In box. You will see the New Member Sign Up page. 3. Enter your [employer] Access Code exactly as it appears below. You will not need to use this code after youve completed the sign-up process. If you do not sign up before the expiration date, you must request a new code. · [employer] Access Code: [email or web address] Expires: 8/8/2018 11:00 AM 
 
4. Enter the last four digits of your Social Security Number (xxxx) and Date of Birth (mm/dd/yyyy) as indicated and click Submit. You will be taken to the next sign-up page. 5. Create a [employer] ID. This will be your [employer] login ID and cannot be changed, so think of one that is secure and easy to remember. 6. Create a [employer] password. You can change your password at any time. 7. Enter your Password Reset Question and Answer. This can be used at a later time if you forget your password. 8. Enter your e-mail address. You will receive e-mail notification when new information is available in [address] 9. Click Sign Up. You can now view and download portions of your medical record. 10. Click the Download Summary menu link to download a portable copy of your medical information. If you have questions, please visit the Frequently Asked Questions section of the [employer] website. Remember, [employer] is NOT to be used for urgent needs. For medical emergencies, dial 911. Now available from your iPhone and Android! Please provide this summary of care documentation to your next provider. Your primary care clinician is listed as Gurinder Payne. If you have any questions after today's visit, please call [phone].

## 2018-05-10 NOTE — PATIENT INSTRUCTIONS
Body Mass Index: Care Instructions  Your Care Instructions    Body mass index (BMI) can help you see if your weight is raising your risk for health problems. It uses a formula to compare how much you weigh with how tall you are. · A BMI lower than 18.5 is considered underweight. · A BMI between 18.5 and 24.9 is considered healthy. · A BMI between 25 and 29.9 is considered overweight. A BMI of 30 or higher is considered obese. If your BMI is in the normal range, it means that you have a lower risk for weight-related health problems. If your BMI is in the overweight or obese range, you may be at increased risk for weight-related health problems, such as high blood pressure, heart disease, stroke, arthritis or joint pain, and diabetes. If your BMI is in the underweight range, you may be at increased risk for health problems such as fatigue, lower protection (immunity) against illness, muscle loss, bone loss, hair loss, and hormone problems. BMI is just one measure of your risk for weight-related health problems. You may be at higher risk for health problems if you are not active, you eat an unhealthy diet, or you drink too much alcohol or use tobacco products. Follow-up care is a key part of your treatment and safety. Be sure to make and go to all appointments, and call your doctor if you are having problems. It's also a good idea to know your test results and keep a list of the medicines you take. How can you care for yourself at home? · Practice healthy eating habits. This includes eating plenty of fruits, vegetables, whole grains, lean protein, and low-fat dairy. · If your doctor recommends it, get more exercise. Walking is a good choice. Bit by bit, increase the amount you walk every day. Try for at least 30 minutes on most days of the week. · Do not smoke. Smoking can increase your risk for health problems. If you need help quitting, talk to your doctor about stop-smoking programs and medicines. These can increase your chances of quitting for good. · Limit alcohol to 2 drinks a day for men and 1 drink a day for women. Too much alcohol can cause health problems. If you have a BMI higher than 25  · Your doctor may do other tests to check your risk for weight-related health problems. This may include measuring the distance around your waist. A waist measurement of more than 40 inches in men or 35 inches in women can increase the risk of weight-related health problems. · Talk with your doctor about steps you can take to stay healthy or improve your health. You may need to make lifestyle changes to lose weight and stay healthy, such as changing your diet and getting regular exercise. If you have a BMI lower than 18.5  · Your doctor may do other tests to check your risk for health problems. · Talk with your doctor about steps you can take to stay healthy or improve your health. You may need to make lifestyle changes to gain or maintain weight and stay healthy, such as getting more healthy foods in your diet and doing exercises to build muscle. Where can you learn more? Go to http://jhonny-amari.info/. Enter S176 in the search box to learn more about \"Body Mass Index: Care Instructions. \"  Current as of: October 13, 2016  Content Version: 11.4  © 6181-6091 Healthwise, Incorporated. Care instructions adapted under license by [employer] (which disclaims liability or warranty for this information). If you have questions about a medical condition or this instruction, always ask your healthcare professional. [address] any warranty or liability for your use of this information.

## 2018-05-11 LAB
ALBUMIN/CREAT UR: 51.2 MG/G CREAT (ref 0–30)
BASOPHILS # BLD AUTO: 0 X10E3/UL (ref 0–0.2)
BASOPHILS NFR BLD AUTO: 0 %
CHOLEST SERPL-MCNC: 127 MG/DL (ref 100–199)
CREAT UR-MCNC: 112.4 MG/DL
EOSINOPHIL # BLD AUTO: 0.1 X10E3/UL (ref 0–0.4)
EOSINOPHIL NFR BLD AUTO: 2 %
ERYTHROCYTE [DISTWIDTH] IN BLOOD BY AUTOMATED COUNT: 16.4 % (ref 12.3–15.4)
EST. AVERAGE GLUCOSE BLD GHB EST-MCNC: 163 MG/DL
HBA1C MFR BLD: 7.3 % (ref 4.8–5.6)
HCT VFR BLD AUTO: 40.3 % (ref 37.5–51)
HDLC SERPL-MCNC: 53 MG/DL
HGB BLD-MCNC: 12.4 G/DL (ref 13–17.7)
IMM GRANULOCYTES # BLD: 0 X10E3/UL (ref 0–0.1)
IMM GRANULOCYTES NFR BLD: 0 %
LDLC SERPL CALC-MCNC: 43 MG/DL (ref 0–99)
LYMPHOCYTES # BLD AUTO: 1.9 X10E3/UL (ref 0.7–3.1)
LYMPHOCYTES NFR BLD AUTO: 31 %
MCH RBC QN AUTO: 22.1 PG (ref 26.6–33)
MCHC RBC AUTO-ENTMCNC: 30.8 G/DL (ref 31.5–35.7)
MCV RBC AUTO: 72 FL (ref 79–97)
MICROALBUMIN UR-MCNC: 57.6 UG/ML
MONOCYTES # BLD AUTO: 0.6 X10E3/UL (ref 0.1–0.9)
MONOCYTES NFR BLD AUTO: 10 %
NEUTROPHILS # BLD AUTO: 3.6 X10E3/UL (ref 1.4–7)
NEUTROPHILS NFR BLD AUTO: 57 %
PLATELET # BLD AUTO: 227 X10E3/UL (ref 150–379)
RBC # BLD AUTO: 5.61 X10E6/UL (ref 4.14–5.8)
TRIGL SERPL-MCNC: 157 MG/DL (ref 0–149)
VLDLC SERPL CALC-MCNC: 31 MG/DL (ref 5–40)
WBC # BLD AUTO: 6.3 X10E3/UL (ref 3.4–10.8)

## 2018-08-06 RX ORDER — ATORVASTATIN CALCIUM 40 MG/1
40 TABLET, FILM COATED ORAL DAILY
Qty: 90 TAB | Refills: 3 | Status: SHIPPED | OUTPATIENT
Start: 2018-08-06 | End: 2019-09-04 | Stop reason: SDUPTHER

## 2018-08-16 ENCOUNTER — OFFICE VISIT (OUTPATIENT)
Dept: INTERNAL MEDICINE CLINIC | Age: 58
End: 2018-08-16

## 2018-08-16 VITALS
TEMPERATURE: 97.4 F | SYSTOLIC BLOOD PRESSURE: 135 MMHG | HEART RATE: 87 BPM | HEIGHT: 70 IN | BODY MASS INDEX: 32.77 KG/M2 | OXYGEN SATURATION: 98 % | DIASTOLIC BLOOD PRESSURE: 77 MMHG | RESPIRATION RATE: 18 BRPM | WEIGHT: 228.9 LBS

## 2018-08-16 DIAGNOSIS — I63.312 CEREBROVASCULAR ACCIDENT (CVA) DUE TO THROMBOSIS OF LEFT MIDDLE CEREBRAL ARTERY (HCC): ICD-10-CM

## 2018-08-16 DIAGNOSIS — E11.21 TYPE 2 DIABETES WITH NEPHROPATHY (HCC): Primary | ICD-10-CM

## 2018-08-16 DIAGNOSIS — D64.9 ANEMIA, UNSPECIFIED TYPE: ICD-10-CM

## 2018-08-16 DIAGNOSIS — I10 ESSENTIAL HYPERTENSION: ICD-10-CM

## 2018-08-16 RX ORDER — SILDENAFIL 100 MG/1
100 TABLET, FILM COATED ORAL AS NEEDED
Qty: 10 TAB | Refills: 11 | Status: SHIPPED | OUTPATIENT
Start: 2018-08-16 | End: 2019-09-15 | Stop reason: SDUPTHER

## 2018-08-16 NOTE — PROGRESS NOTES
SPORTS MEDICINE AND PRIMARY CARE  Jewell Bang MD, [address] [address]  Phone:  [phone]  Fax: [phone]       Chief Complaint   Patient presents with    Diabetes   . SUBJECTIVE:    Chantelle Martino is a 59 y.o. male  Dictation on: 08/16/2018 12:01 PM by: Maeve Adkins      Dictation on: 08/16/2018 12:04 PM by: Maeve Adkins            Current Outpatient Prescriptions   Medication Sig Dispense Refill    sildenafil citrate (VIAGRA) 100 mg tablet Take 1 Tab by mouth as needed. 10 Tab 11    atorvastatin (LIPITOR) 40 mg tablet Take 1 Tab by mouth daily. 90 Tab 3    insulin NPH/insulin regular (NOVOLIN 70/30, HUMULIN 70/30) 100 unit/mL (70-30) injection 8 Units by SubCUTAneous route Daily (before breakfast). 10 mL 11    lisinopril-hydroCHLOROthiazide (PRINZIDE, ZESTORETIC) 20-12.5 mg per tablet Take 1 Tab by mouth daily. 90 Tab 11    aspirin 81 mg chewable tablet Take 1 Tab by mouth daily. 100 Tab 11    metFORMIN ER (GLUCOPHAGE XR) 500 mg tablet Take 2 Tabs by mouth two (2) times daily (with meals). 120 Tab 11     Past Medical History:   Diagnosis Date    Anemia     DM [facility]diabetes mellitus) [facility]     HTN (hypertension)     Preventative health care     Right knee gives way     Scrotal wall abscess     Stroke ([facility] 11/29/2017    l hemiparesis     History reviewed. No pertinent surgical history.   No Known Allergies      REVIEW OF SYSTEMS:  General: negative for - chills or fever  ENT: negative for - headaches, nasal congestion or tinnitus  Respiratory: negative for - cough, hemoptysis, shortness of breath or wheezing  Cardiovascular : negative for - chest pain, edema, palpitations or shortness of breath  Gastrointestinal: negative for - abdominal pain, blood in stools, heartburn or nausea/vomiting  Genito-Urinary: no dysuria, trouble voiding, or hematuria  Musculoskeletal: negative for - gait disturbance, joint pain, joint stiffness or joint swelling  Neurological: no TIA or stroke symptoms  Hematologic: no bruises, no bleeding, no swollen glands  Integument: no lumps, mole changes, nail changes or rash  Endocrine: no malaise/lethargy or unexpected weight changes      Social History     Social History    Marital status:      Spouse name: N/A    Number of children: N/A    Years of education: N/A     Social History Main Topics    Smoking status: Never Smoker    Smokeless tobacco: Never Used    Alcohol use No      Comment: occassionally    Drug use: No    Sexual activity: Yes     Partners: Female     Birth control/ protection: None     Other Topics Concern    None     Social History Narrative    ** Merged History Encounter **     Habits: There is no history of cigarette abuse, occasional alcohol use. No drug abuse.           Social History:  Patient is  15 - Anna German. He has no children and another person stays with him. Patient is an LPN in Psychiatry at [address]         Family History:  Father  at 67 related to congestive heart failure. Mother was 76 and complications of diabetes. He has two brothers and one sister who are alive and well. Family History   Problem Relation Age of Onset    Diabetes Mother     Heart Disease Father        OBJECTIVE:    Visit Vitals    /77    Pulse 87    Temp 97.4 °F (36.3 °C) (Oral)    Resp 18    Ht 5' 10\" (1.778 m)    Wt 228 lb 14.4 oz (103.8 kg)    SpO2 98%    BMI 32.84 kg/m2     CONSTITUTIONAL: well , well nourished, appears age appropriate  EYES: perrla, eom intact  ENMT:moist mucous membranes, pharynx clear  NECK: supple. Thyroid normal  RESPIRATORY: Chest: clear bilaterally   CARDIOVASCULAR: Heart: regular rate and rhythm  GASTROINTESTINAL: Abdomen: soft, bowel sounds active  HEMATOLOGIC: no pathological lymph nodes palpated  MUSCULOSKELETAL: Extremities: no edema, pulse 1+   INTEGUMENT: No unusual rashes or suspicious skin lesions noted. Nails appear normal.  NEUROLOGIC: non-focal exam   MENTAL STATUS: alert and oriented, appropriate affect           ASSESSMENT:  1. Type 2 diabetes with nephropathy ([facility]    2. Cerebrovascular accident (CVA) due to thrombosis of left middle cerebral artery ([facility]    3. Anemia, unspecified type    4. Essential hypertension       Dictation on: 08/16/2018 12:07 PM by: Birgit Bingham       I have discussed the diagnosis with the patient and the intended plan as seen in the  orders above. The patient understands and agees with the plan. The patient has   received an after visit summary and questions were answered concerning  future plans  Patient labs and/or xrays were reviewed  Past records were reviewed. PLAN:  .  Orders Placed This Encounter    HEMOGLOBIN A1C WITH EAG    CBC WITH AUTOMATED DIFF    HEMOGLOBIN FRACTIONATION    sildenafil citrate (VIAGRA) 100 mg tablet       Follow-up Disposition:  Return in about 3 months (around 11/16/2018). ATTENTION:   This medical record was transcribed using an electronic medical records system. Although proofread, it may and can contain electronic and spelling errors. Other human spelling and other errors may be present. Corrections may be executed at a later time. Please feel free to contact us for any clarifications as needed.

## 2018-08-16 NOTE — PROGRESS NOTES
1. Have you been to the ER, urgent care clinic since your last visit? Hospitalized since your last visit? No    2. Have you seen or consulted any other health care providers outside of the [address] [place] [address] since your last visit? Include any pap smears or colon screening.  No     Wants discuss lab results and ED  Needs to be scheduled for colonoscopy

## 2018-08-16 NOTE — MR AVS SNAPSHOT
Jasvir Sparrow 
 
 
 [id number] 
[phone] Patient: Imelda De Jesus MRN: [id number] AGY:[birth year] Visit Information Date & Time Provider Department Dept. Phone Encounter #  
 8/16/2018 10:45 AM Lesly Franco MD [facility] Sports Medicine and Primary Care [phone] [id number] Follow-up Instructions Return in about 3 months (around 11/16/2018). Follow-up and Disposition History Your Appointments 12/13/2018 10:45 AM  
Any with Lesly Franco MD  
[address] [place] [address] and Primary Care [address] [place] [address]) Appt Note: 4 month follow up  
 [address] [facility]  
  
   
 [id number] Upcoming Health Maintenance Date Due COLONOSCOPY 3/7/1976 Influenza Age 5 to Adult 9/16/2018* HEMOGLOBIN A1C Q6M 11/10/2018 FOOT EXAM Q1 2/1/2019 MICROALBUMIN Q1 5/10/2019 LIPID PANEL Q1 5/10/2019 DTaP/Tdap/Td series (2 - Td) 2/1/2028 *Topic was postponed. The date shown is not the original due date. Allergies as of 8/16/2018  Review Complete On: 8/16/2018 By: Gabo May LPN No Known Allergies Current Immunizations  Never Reviewed No immunizations on file. Not reviewed this visit You Were Diagnosed With   
  
 Codes Comments Type 2 diabetes with nephropathy (HCC)    -  Primary ICD-10-CM: E11.21 
ICD-9-CM: 250.40, 583.81 Cerebrovascular accident (CVA) due to thrombosis of left middle cerebral artery ([facility]     ICD-10-CM: C69.942 ICD-9-CM: 434.01 Anemia, unspecified type     ICD-10-CM: D64.9 ICD-9-CM: 285.9 Essential hypertension     ICD-10-CM: I10 
ICD-9-CM: 401.9 Vitals BP Pulse Temp Resp Height(growth percentile) Weight(growth percentile) 135/77 87 97.4 °F (36.3 °C) (Oral) 18 5' 10\" (1.778 m) 228 lb 14.4 oz (103.8 kg) SpO2 BMI Smoking Status 98% 32.84 kg/m2 Never Smoker Vitals History BMI and BSA Data Body Mass Index Body Surface Area  
 32.84 kg/m 2 2.26 m 2 Preferred Pharmacy Pharmacy Name Phone [address] On license of [facility], [address] [facility] [phone] Your Updated Medication List  
  
   
This list is accurate as of 8/16/18 12:25 PM.  Always use your most recent med list.  
  
  
  
  
 aspirin 81 mg chewable tablet Take 1 Tab by mouth daily. atorvastatin 40 mg tablet Commonly known as:  LIPITOR Take 1 Tab by mouth daily. insulin NPH/insulin regular 100 unit/mL (70-30) injection Commonly known as:  NOVOLIN 70/30, HUMULIN 70/30  
8 Units by SubCUTAneous route Daily (before breakfast). lisinopril-hydroCHLOROthiazide 20-12.5 mg per tablet Commonly known as:  Conda Franc Take 1 Tab by mouth daily. metFORMIN  mg tablet Commonly known as:  GLUCOPHAGE XR Take 2 Tabs by mouth two (2) times daily (with meals). sildenafil citrate 100 mg tablet Commonly known as:  VIAGRA Take 1 Tab by mouth as needed. Prescriptions Printed Refills  
 sildenafil citrate (VIAGRA) 100 mg tablet 11 Sig: Take 1 Tab by mouth as needed. Class: Print Route: Oral  
  
We Performed the Following CBC WITH AUTOMATED DIFF [97493 CPT(R)] COLLECTION VENOUS BLOOD,VENIPUNCTURE [id number] CPT(R)] HEMOGLOBIN A1C WITH EAG [61108 CPT(R)] HEMOGLOBIN FRACTIONATION [[id number] Custom] Follow-up Instructions Return in about 3 months (around 11/16/2018). Introducing [facility]! [employer] introduces [employer] patient portal. Now you can access parts of your medical record, email your doctor's office, and request medication refills online. 1. In your internet browser, go to [email or web address] 2. Click on the First Time User? Click Here link in the Sign In box.  You will see the New Member Sign Up page. 3. Enter your 10X10 Room Access Code exactly as it appears below. You will not need to use this code after youve completed the sign-up process. If you do not sign up before the expiration date, you must request a new code. · 10X10 Room Access Code: [email or web address] Expires: 11/14/2018 12:25 PM 
 
4. Enter the last four digits of your Social Security Number (xxxx) and Date of Birth (mm/dd/yyyy) as indicated and click Submit. You will be taken to the next sign-up page. 5. Create a [employer] ID. This will be your 10X10 Room login ID and cannot be changed, so think of one that is secure and easy to remember. 6. Create a 10X10 Room password. You can change your password at any time. 7. Enter your Password Reset Question and Answer. This can be used at a later time if you forget your password. 8. Enter your e-mail address. You will receive e-mail notification when new information is available in [address] 9. Click Sign Up. You can now view and download portions of your medical record. 10. Click the Download Summary menu link to download a portable copy of your medical information. If you have questions, please visit the Frequently Asked Questions section of the 10X10 Room website. Remember, 10X10 Room is NOT to be used for urgent needs. For medical emergencies, dial 911. Now available from your iPhone and Android! Please provide this summary of care documentation to your next provider. Your primary care clinician is listed as Eclectic Sprinkles. If you have any questions after today's visit, please call [phone].

## 2018-08-19 NOTE — PROGRESS NOTES
Clinically, patient's mental status is stable. Blood pressure  135/77 today. We advised him that we would like to see it the  majority of the time less than 130/80. I discussed with him thathis blood sugar readings are completely  acceptable. His BMI reflects obesity at 32.84, and we encouraged physical  activity and a heart-healthy weight-reducing diet. We will reschedule colonoscopy. He will return to the office in  3 to 4 months, sooner if he has any problems. He was advised he  can walk in to see us at any time should he have an urgency and  unable to get an appointment.

## 2018-08-19 NOTE — PROGRESS NOTES
The patient returns today stating he has been trying to schedule  the colonoscopy and has not been successful. His blood sugars range from a low of 63 mostly between 110 and  130. Other new complaints denied. The patient is seen for  evaluation.

## 2018-08-19 NOTE — PROGRESS NOTES
The patient returns today with known history of diabetes  mellitus, type 2, hemoglobin A1c was over 7; anemia;  hypertension; and CVA with left hemiparesis.

## 2018-08-20 LAB
BASOPHILS # BLD AUTO: 0 X10E3/UL (ref 0–0.2)
BASOPHILS NFR BLD AUTO: 0 %
EOSINOPHIL # BLD AUTO: 0.1 X10E3/UL (ref 0–0.4)
EOSINOPHIL NFR BLD AUTO: 2 %
ERYTHROCYTE [DISTWIDTH] IN BLOOD BY AUTOMATED COUNT: 16.8 % (ref 12.3–15.4)
EST. AVERAGE GLUCOSE BLD GHB EST-MCNC: 169 MG/DL
HBA1C MFR BLD: 7.5 % (ref 4.8–5.6)
HCT VFR BLD AUTO: 40.3 % (ref 37.5–51)
HGB A MFR BLD: 98.1 % (ref 96.4–98.8)
HGB A2 MFR BLD COLUMN CHROM: 1.9 % (ref 1.8–3.2)
HGB BLD-MCNC: 12.5 G/DL (ref 13–17.7)
HGB C MFR BLD: 0 %
HGB F MFR BLD: 0 % (ref 0–2)
HGB FRACT BLD-IMP: NORMAL
HGB OTHER MFR BLD HPLC: 0 %
HGB S BLD QL SOLY: NEGATIVE
HGB S MFR BLD: 0 %
IMM GRANULOCYTES # BLD: 0 X10E3/UL (ref 0–0.1)
IMM GRANULOCYTES NFR BLD: 0 %
LYMPHOCYTES # BLD AUTO: 2.2 X10E3/UL (ref 0.7–3.1)
LYMPHOCYTES NFR BLD AUTO: 35 %
MCH RBC QN AUTO: 22.4 PG (ref 26.6–33)
MCHC RBC AUTO-ENTMCNC: 31 G/DL (ref 31.5–35.7)
MCV RBC AUTO: 72 FL (ref 79–97)
MONOCYTES # BLD AUTO: 0.5 X10E3/UL (ref 0.1–0.9)
MONOCYTES NFR BLD AUTO: 7 %
NEUTROPHILS # BLD AUTO: 3.4 X10E3/UL (ref 1.4–7)
NEUTROPHILS NFR BLD AUTO: 56 %
PLATELET # BLD AUTO: 213 X10E3/UL (ref 150–379)
RBC # BLD AUTO: 5.59 X10E6/UL (ref 4.14–5.8)
WBC # BLD AUTO: 6.2 X10E3/UL (ref 3.4–10.8)

## 2018-12-02 RX ORDER — METFORMIN HYDROCHLORIDE 500 MG/1
TABLET, EXTENDED RELEASE ORAL
Qty: 120 TAB | Refills: 11 | Status: SHIPPED | OUTPATIENT
Start: 2018-12-02 | End: 2019-11-30 | Stop reason: SDUPTHER

## 2018-12-13 ENCOUNTER — OFFICE VISIT (OUTPATIENT)
Dept: INTERNAL MEDICINE CLINIC | Age: 58
End: 2018-12-13

## 2018-12-13 VITALS
DIASTOLIC BLOOD PRESSURE: 79 MMHG | HEIGHT: 70 IN | TEMPERATURE: 97.9 F | SYSTOLIC BLOOD PRESSURE: 160 MMHG | BODY MASS INDEX: 33.7 KG/M2 | OXYGEN SATURATION: 96 % | WEIGHT: 235.4 LBS | HEART RATE: 87 BPM | RESPIRATION RATE: 18 BRPM

## 2018-12-13 DIAGNOSIS — E11.21 TYPE 2 DIABETES WITH NEPHROPATHY (HCC): Primary | ICD-10-CM

## 2018-12-13 DIAGNOSIS — I10 ESSENTIAL HYPERTENSION: ICD-10-CM

## 2018-12-13 DIAGNOSIS — D64.9 ANEMIA, UNSPECIFIED TYPE: ICD-10-CM

## 2018-12-13 RX ORDER — NEBIVOLOL 5 MG/1
5 TABLET ORAL DAILY
Qty: 30 TAB | Refills: 11 | Status: SHIPPED | OUTPATIENT
Start: 2018-12-13 | End: 2018-12-17

## 2018-12-13 NOTE — PROGRESS NOTES
1. Have you been to the ER, urgent care clinic since your last visit? Hospitalized since your last visit? No    2. Have you seen or consulted any other health care providers outside of the [address] [place] [address] since your last visit? Include any pap smears or colon screening.  No

## 2018-12-13 NOTE — PATIENT INSTRUCTIONS
Body Mass Index: Care Instructions  Your Care Instructions    Body mass index (BMI) can help you see if your weight is raising your risk for health problems. It uses a formula to compare how much you weigh with how tall you are. · A BMI lower than 18.5 is considered underweight. · A BMI between 18.5 and 24.9 is considered healthy. · A BMI between 25 and 29.9 is considered overweight. A BMI of 30 or higher is considered obese. If your BMI is in the normal range, it means that you have a lower risk for weight-related health problems. If your BMI is in the overweight or obese range, you may be at increased risk for weight-related health problems, such as high blood pressure, heart disease, stroke, arthritis or joint pain, and diabetes. If your BMI is in the underweight range, you may be at increased risk for health problems such as fatigue, lower protection (immunity) against illness, muscle loss, bone loss, hair loss, and hormone problems. BMI is just one measure of your risk for weight-related health problems. You may be at higher risk for health problems if you are not active, you eat an unhealthy diet, or you drink too much alcohol or use tobacco products. Follow-up care is a key part of your treatment and safety. Be sure to make and go to all appointments, and call your doctor if you are having problems. It's also a good idea to know your test results and keep a list of the medicines you take. How can you care for yourself at home? · Practice healthy eating habits. This includes eating plenty of fruits, vegetables, whole grains, lean protein, and low-fat dairy. · If your doctor recommends it, get more exercise. Walking is a good choice. Bit by bit, increase the amount you walk every day. Try for at least 30 minutes on most days of the week. · Do not smoke. Smoking can increase your risk for health problems. If you need help quitting, talk to your doctor about stop-smoking programs and medicines. These can increase your chances of quitting for good. · Limit alcohol to 2 drinks a day for men and 1 drink a day for women. Too much alcohol can cause health problems. If you have a BMI higher than 25  · Your doctor may do other tests to check your risk for weight-related health problems. This may include measuring the distance around your waist. A waist measurement of more than 40 inches in men or 35 inches in women can increase the risk of weight-related health problems. · Talk with your doctor about steps you can take to stay healthy or improve your health. You may need to make lifestyle changes to lose weight and stay healthy, such as changing your diet and getting regular exercise. If you have a BMI lower than 18.5  · Your doctor may do other tests to check your risk for health problems. · Talk with your doctor about steps you can take to stay healthy or improve your health. You may need to make lifestyle changes to gain or maintain weight and stay healthy, such as getting more healthy foods in your diet and doing exercises to build muscle. Where can you learn more? Go to http://jhonny-amari.info/. Enter S176 in the search box to learn more about \"Body Mass Index: Care Instructions. \"  Current as of: October 13, 2016  Content Version: 11.4  © 8400-0213 [employer]. Care instructions adapted under license by [employer] (which disclaims liability or warranty for this information). If you have questions about a medical condition or this instruction, always ask your healthcare professional. [address] any warranty or liability for your use of this information.

## 2018-12-13 NOTE — PROGRESS NOTES
SPORTS MEDICINE AND PRIMARY CARE  Milagros Malone MD, [address] [address]  Phone:  [phone]  Fax: [phone]       Chief Complaint   Patient presents with    Diabetes     f/u   . SUBJECTIVE:    Kristen Matos is a 59 y.o. male Patient returns today with known history of diabetes mellitus, whose control is fair with Hgb A1c of 7.5, primary hypertension, CVA with left hemiparesis and is seen for evaluation       Current Outpatient Medications   Medication Sig Dispense Refill    nebivolol (BYSTOLIC) 5 mg tablet Take 1 Tab by mouth daily. 30 Tab 11    metFORMIN ER (GLUCOPHAGE XR) 500 mg tablet TAKE TWO TABLETS BY MOUTH TWICE DAILY WITH MEALS 120 Tab 11    sildenafil citrate (VIAGRA) 100 mg tablet Take 1 Tab by mouth as needed. 10 Tab 11    atorvastatin (LIPITOR) 40 mg tablet Take 1 Tab by mouth daily. 90 Tab 3    insulin NPH/insulin regular (NOVOLIN 70/30, HUMULIN 70/30) 100 unit/mL (70-30) injection 8 Units by SubCUTAneous route Daily (before breakfast). 10 mL 11    lisinopril-hydroCHLOROthiazide (PRINZIDE, ZESTORETIC) 20-12.5 mg per tablet Take 1 Tab by mouth daily. 90 Tab 11    aspirin 81 mg chewable tablet Take 1 Tab by mouth daily. 100 Tab 11   . Past Medical History:   Diagnosis Date    Anemia     [facility]     HTN (hypertension)     Preventative health care     Right knee gives way     Scrotal wall abscess     Stroke ([facility] 11/29/2017    l hemiparesis     History reviewed. No pertinent surgical history.   No Known Allergies      REVIEW OF SYSTEMS:  General: negative for - chills or fever  ENT: negative for - headaches, nasal congestion or tinnitus  Respiratory: negative for - cough, hemoptysis, shortness of breath or wheezing  Cardiovascular : negative for - chest pain, edema, palpitations or shortness of breath  Gastrointestinal: negative for - abdominal pain, blood in stools, heartburn or nausea/vomiting  Genito-Urinary: no dysuria, trouble voiding, or hematuria  Musculoskeletal: negative for - gait disturbance, joint pain, joint stiffness or joint swelling  Neurological: no TIA or stroke symptoms  Hematologic: no bruises, no bleeding, no swollen glands  Integument: no lumps, mole changes, nail changes or rash  Endocrine: no malaise/lethargy or unexpected weight changes      Social History     Socioeconomic History    Marital status:      Spouse name: Not on file    Number of children: Not on file    Years of education: Not on file    Highest education level: Not on file   Tobacco Use    Smoking status: Never Smoker    Smokeless tobacco: Never Used   Substance and Sexual Activity    Alcohol use: No     Comment: occassionally    Drug use: No    Sexual activity: Yes     Partners: Female     Birth control/protection: None   Social History Narrative    ** Merged History Encounter **     Habits: There is no history of cigarette abuse, occasional alcohol use. No drug abuse.           Social History:  Patient is  15 - Bety Blandon. He has no children and another person stays with him. 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 Mother was 76 and complications of diabetes. He has two brothers and one sister who are alive and well. Family History   Problem Relation Age of Onset    Diabetes Mother     Heart Disease Father        OBJECTIVE:    Visit Vitals  /79   Pulse 87   Temp 97.9 °F (36.6 °C) (Oral)   Resp 18   Ht 5' 10\" (1.778 m)   Wt 235 lb 6.4 oz (106.8 kg)   SpO2 96%   BMI 33.78 kg/m²     CONSTITUTIONAL: well , well nourished, appears age appropriate  EYES: perrla, eom intact  ENMT:moist mucous membranes, pharynx clear  NECK: supple.  Thyroid normal  RESPIRATORY: Chest: clear bilaterally   CARDIOVASCULAR: Heart: regular rate and rhythm  GASTROINTESTINAL: Abdomen: soft, bowel sounds active  HEMATOLOGIC: no pathological lymph nodes palpated  MUSCULOSKELETAL: Extremities: no edema, pulse 1+   INTEGUMENT: No unusual rashes or suspicious skin lesions noted. Nails appear normal.  NEUROLOGIC: non-focal exam   MENTAL STATUS: alert and oriented, appropriate affect           ASSESSMENT:  1. Type 2 diabetes with nephropathy ([facility]    2. Essential hypertension    3. Anemia, unspecified type      Patient's blood pressure has been fluctuating from 140 to over 623 systolic. He states he has stress at work with conflicts. He usually \"takes the high road and walks away\". He also has stress at home, not with his wife Peyton Menjivar, but with other family members. He claims he going to start working out on a regular basis, which he thinks will help the stress, and I do not disagree. Note increase in BMI. We encouraged physical activity 30 minutes five days a week and a heart healthy diet. We will assess blood sugar control with Hgb A1c and report to him the results. He will return to see us in 3-4 months, sooner if he has any problems. Discussed the patient's BMI with him. The BMI follow up plan is as follows:     dietary management education, guidance, and counseling  encourage exercise  monitor weight  prescribed dietary intake    I have discussed the diagnosis with the patient and the intended plan as seen in the  orders above. The patient understands and agees with the plan. The patient has   received an after visit summary and questions were answered concerning  future plans  Patient labs and/or xrays were reviewed  Past records were reviewed. PLAN:  .  Orders Placed This Encounter    HEMOGLOBIN A1C WITH EAG    nebivolol (BYSTOLIC) 5 mg tablet       Follow-up Disposition:  Return in about 4 months (around 4/13/2019). ATTENTION:   This medical record was transcribed using an electronic medical records system. Although proofread, it may and can contain electronic and spelling errors.   Other human spelling and other errors may be present. Corrections may be executed at a later time. Please feel free to contact us for any clarifications as needed.

## 2018-12-14 LAB
EST. AVERAGE GLUCOSE BLD GHB EST-MCNC: 151 MG/DL
HBA1C MFR BLD: 6.9 % (ref 4.8–5.6)

## 2018-12-17 RX ORDER — METOPROLOL SUCCINATE 25 MG/1
25 TABLET, EXTENDED RELEASE ORAL DAILY
Qty: 30 TAB | Refills: 11 | Status: SHIPPED | OUTPATIENT
Start: 2018-12-17 | End: 2019-12-22

## 2019-01-08 RX ORDER — LISINOPRIL AND HYDROCHLOROTHIAZIDE 12.5; 2 MG/1; MG/1
TABLET ORAL
Qty: 30 TAB | Refills: 35 | Status: SHIPPED | OUTPATIENT
Start: 2019-01-08 | End: 2020-02-08

## 2019-03-14 ENCOUNTER — OFFICE VISIT (OUTPATIENT)
Dept: INTERNAL MEDICINE CLINIC | Age: 59
End: 2019-03-14

## 2019-03-14 VITALS
RESPIRATION RATE: 18 BRPM | WEIGHT: 241 LBS | HEART RATE: 85 BPM | OXYGEN SATURATION: 96 % | BODY MASS INDEX: 34.5 KG/M2 | SYSTOLIC BLOOD PRESSURE: 159 MMHG | DIASTOLIC BLOOD PRESSURE: 86 MMHG | HEIGHT: 70 IN | TEMPERATURE: 97.5 F

## 2019-03-14 DIAGNOSIS — E11.21 TYPE 2 DIABETES WITH NEPHROPATHY (HCC): Primary | ICD-10-CM

## 2019-03-14 DIAGNOSIS — I63.312 CEREBROVASCULAR ACCIDENT (CVA) DUE TO THROMBOSIS OF LEFT MIDDLE CEREBRAL ARTERY (HCC): ICD-10-CM

## 2019-03-14 DIAGNOSIS — I10 ESSENTIAL HYPERTENSION: ICD-10-CM

## 2019-03-14 RX ORDER — AMLODIPINE BESYLATE 5 MG/1
5 TABLET ORAL DAILY
Qty: 30 TAB | Refills: 11 | Status: SHIPPED | OUTPATIENT
Start: 2019-03-14 | End: 2020-01-30

## 2019-03-14 NOTE — PROGRESS NOTES
SPORTS MEDICINE AND PRIMARY CARE  Cindi Munoz MD, [address] [facility],[address] [address]  Phone:  [phone]  Fax: [phone]       Chief Complaint   Patient presents with    Hypertension     f/u   . SUBJECTIVE:    Marva Hatch is a 62 y.o. male Patient returns today with known history of diabetes mellitus type 2, primary hypertension, CVA with left hemiparesis, anemia, and is seen for evaluation. Current Outpatient Medications   Medication Sig Dispense Refill    amLODIPine (NORVASC) 5 mg tablet Take 1 Tab by mouth daily. 30 Tab 11    lisinopril-hydroCHLOROthiazide (PRINZIDE, ZESTORETIC) 20-12.5 mg per tablet TAKE ONE TABLET BY MOUTH ONCE DAILY 30 Tab 35    metoprolol succinate (TOPROL-XL) 25 mg XL tablet Take 1 Tab by mouth daily. 30 Tab 11    metFORMIN ER (GLUCOPHAGE XR) 500 mg tablet TAKE TWO TABLETS BY MOUTH TWICE DAILY WITH MEALS 120 Tab 11    sildenafil citrate (VIAGRA) 100 mg tablet Take 1 Tab by mouth as needed. 10 Tab 11    atorvastatin (LIPITOR) 40 mg tablet Take 1 Tab by mouth daily. 90 Tab 3    insulin NPH/insulin regular (NOVOLIN 70/30, HUMULIN 70/30) 100 unit/mL (70-30) injection 8 Units by SubCUTAneous route Daily (before breakfast). 10 mL 11    aspirin 81 mg chewable tablet Take 1 Tab by mouth daily. 100 Tab 11     Past Medical History:   Diagnosis Date    Anemia     DM [facility]di[facility]     HTN (hypertension)     Preventative health care     Right knee gives way     Scrotal wall abscess     Stroke ([facility] [facility] 11/29/2017    l hemiparesis     History reviewed. No pertinent surgical history.   No Known Allergies      REVIEW OF SYSTEMS:  General: negative for - chills or fever  ENT: negative for - headaches, nasal congestion or tinnitus  Respiratory: negative for - cough, hemoptysis, shortness of breath or wheezing  Cardiovascular : negative for - chest pain, edema, palpitations or shortness of breath  Gastrointestinal: negative for - abdominal pain, blood in stools, heartburn or nausea/vomiting  Genito-Urinary: no dysuria, trouble voiding, or hematuria  Musculoskeletal: negative for - gait disturbance, joint pain, joint stiffness or joint swelling  Neurological: no TIA or stroke symptoms  Hematologic: no bruises, no bleeding, no swollen glands  Integument: no lumps, mole changes, nail changes or rash  Endocrine: no malaise/lethargy or unexpected weight changes      Social History     Socioeconomic History    Marital status:      Spouse name: Not on file    Number of children: Not on file    Years of education: Not on file    Highest education level: Not on file   Tobacco Use    Smoking status: Never Smoker    Smokeless tobacco: Never Used   Substance and Sexual Activity    Alcohol use: No     Comment: occassionally    Drug use: No    Sexual activity: Yes     Partners: Female     Birth control/protection: None   Social History Narrative    ** Merged History Encounter **     Habits: There is no history of cigarette abuse, occasional alcohol use. No drug abuse.           Social History:  Patient is  15 - Nahomi Zuñiga. He has no children and another person stays with him. 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 Mother was 76 and complications of diabetes. He has two brothers and one sister who are alive and well. Family History   Problem Relation Age of Onset    Diabetes Mother     Heart Disease Father        OBJECTIVE:    Visit Vitals  /86   Pulse 85   Temp 97.5 °F (36.4 °C) (Oral)   Resp 18   Ht 5' 10\" (1.778 m)   Wt 241 lb (109.3 kg)   SpO2 96%   BMI 34.58 kg/m²     CONSTITUTIONAL: well , well nourished, appears age appropriate  EYES: perrla, eom intact  ENMT:moist mucous membranes, pharynx clear  NECK: supple.  Thyroid normal  RESPIRATORY: Chest: clear bilaterally   CARDIOVASCULAR: Heart: regular rate and rhythm  GASTROINTESTINAL: Abdomen: soft, bowel sounds active  HEMATOLOGIC: no pathological lymph nodes palpated  MUSCULOSKELETAL: Extremities: no edema, pulse 1+   INTEGUMENT: No unusual rashes or suspicious skin lesions noted. Nails appear normal.  NEUROLOGIC: non-focal exam   MENTAL STATUS: alert and oriented, appropriate affect           ASSESSMENT:  1. Type 2 diabetes with nephropathy ([facility]    2. Essential hypertension    3. Cerebrovascular accident (CVA) due to thrombosis of left middle cerebral artery ([facility] [facility]      Blood pressure control is lacking here, as well as at home. We would like to see his blood pressure at 130/80 or less. He is currently on Lisinopril/Hydrochlorothiazide and Metoprolol for his blood pressure. We will add Amlodipine, which should allow the blood pressure to drop down to normal.    We note that he has made a decision to allow his BMI to reflect a 6 lb increase in weight and we encouraged physical activity for 30 minutes five days a week and a heart healthy, weight reducing diet. I think that although he may have multiple excuses because of his history, physical activity is a very, very high priority for him to maintain his health and keep events that have happened in the past from reoccurring. He will be back to see us in about three months. He has checked his blood pressures at home, if he finds that the addition of Amlodipine does not bring his blood pressure down, he will give Sam Oneal a call and we will make another adjustment. I have discussed the diagnosis with the patient and the intended plan as seen in the  orders above. The patient understands and agees with the plan. The patient has   received an after visit summary and questions were answered concerning  future plans  Patient labs and/or xrays were reviewed  Past records were reviewed.     PLAN:  .  Orders Placed This Encounter    URINALYSIS W/ RFLX MICROSCOPIC    CBC WITH AUTOMATED DIFF    METABOLIC PANEL, COMPREHENSIVE    LIPID PANEL    PROSTATE SPECIFIC AG    HEMOGLOBIN A1C WITH EAG    REFERRAL FOR COLONOSCOPY    amLODIPine (NORVASC) 5 mg tablet       Follow-up Disposition:  Return in about 3 months (around 6/14/2019). ATTENTION:   This medical record was transcribed using an electronic medical records system. Although proofread, it may and can contain electronic and spelling errors. Other human spelling and other errors may be present. Corrections may be executed at a later time. Please feel free to contact us for any clarifications as needed.

## 2019-03-14 NOTE — PROGRESS NOTES
1. Have you been to the ER, urgent care clinic since your last visit? Hospitalized since your last visit? No    2. Have you seen or consulted any other health care providers outside of the [address] [place] [address] since your last visit? Include any pap smears or colon screening.  No

## 2019-03-15 LAB
ALBUMIN SERPL-MCNC: 4.6 G/DL (ref 3.6–4.8)
ALBUMIN/GLOB SERPL: 2 {RATIO} (ref 1.2–2.2)
ALP SERPL-CCNC: 61 IU/L (ref 39–117)
ALT SERPL-CCNC: 28 IU/L (ref 0–44)
APPEARANCE UR: CLEAR
AST SERPL-CCNC: 23 IU/L (ref 0–40)
BACTERIA #/AREA URNS HPF: NORMAL /[HPF]
BASOPHILS # BLD AUTO: 0 X10E3/UL (ref 0–0.2)
BASOPHILS NFR BLD AUTO: 1 %
BILIRUB SERPL-MCNC: <0.2 MG/DL (ref 0–1.2)
BILIRUB UR QL STRIP: NEGATIVE
BUN SERPL-MCNC: 13 MG/DL (ref 8–27)
BUN/CREAT SERPL: 14 (ref 10–24)
CALCIUM SERPL-MCNC: 9.5 MG/DL (ref 8.6–10.2)
CASTS URNS QL MICRO: NORMAL /LPF
CHLORIDE SERPL-SCNC: 103 MMOL/L (ref 96–106)
CHOLEST SERPL-MCNC: 126 MG/DL (ref 100–199)
CO2 SERPL-SCNC: 22 MMOL/L (ref 20–29)
COLOR UR: YELLOW
CREAT SERPL-MCNC: 0.92 MG/DL (ref 0.76–1.27)
EOSINOPHIL # BLD AUTO: 0.1 X10E3/UL (ref 0–0.4)
EOSINOPHIL NFR BLD AUTO: 2 %
EPI CELLS #/AREA URNS HPF: NORMAL /HPF
ERYTHROCYTE [DISTWIDTH] IN BLOOD BY AUTOMATED COUNT: 17.2 % (ref 12.3–15.4)
EST. AVERAGE GLUCOSE BLD GHB EST-MCNC: 163 MG/DL
GLOBULIN SER CALC-MCNC: 2.3 G/DL (ref 1.5–4.5)
GLUCOSE SERPL-MCNC: 150 MG/DL (ref 65–99)
GLUCOSE UR QL: ABNORMAL
HBA1C MFR BLD: 7.3 % (ref 4.8–5.6)
HCT VFR BLD AUTO: 42 % (ref 37.5–51)
HDLC SERPL-MCNC: 50 MG/DL
HGB BLD-MCNC: 13.2 G/DL (ref 13–17.7)
HGB UR QL STRIP: NEGATIVE
IMM GRANULOCYTES # BLD AUTO: 0 X10E3/UL (ref 0–0.1)
IMM GRANULOCYTES NFR BLD AUTO: 0 %
KETONES UR QL STRIP: ABNORMAL
LDLC SERPL CALC-MCNC: 35 MG/DL (ref 0–99)
LEUKOCYTE ESTERASE UR QL STRIP: NEGATIVE
LYMPHOCYTES # BLD AUTO: 1.9 X10E3/UL (ref 0.7–3.1)
LYMPHOCYTES NFR BLD AUTO: 36 %
MCH RBC QN AUTO: 22.4 PG (ref 26.6–33)
MCHC RBC AUTO-ENTMCNC: 31.4 G/DL (ref 31.5–35.7)
MCV RBC AUTO: 71 FL (ref 79–97)
MICRO URNS: ABNORMAL
MONOCYTES # BLD AUTO: 0.4 X10E3/UL (ref 0.1–0.9)
MONOCYTES NFR BLD AUTO: 8 %
MUCOUS THREADS URNS QL MICRO: PRESENT
NEUTROPHILS # BLD AUTO: 2.8 X10E3/UL (ref 1.4–7)
NEUTROPHILS NFR BLD AUTO: 53 %
NITRITE UR QL STRIP: NEGATIVE
PH UR STRIP: 5.5 [PH] (ref 5–7.5)
PLATELET # BLD AUTO: 230 X10E3/UL (ref 150–379)
POTASSIUM SERPL-SCNC: 4.1 MMOL/L (ref 3.5–5.2)
PROT SERPL-MCNC: 6.9 G/DL (ref 6–8.5)
PROT UR QL STRIP: ABNORMAL
PSA SERPL-MCNC: 0.5 NG/ML (ref 0–4)
RBC # BLD AUTO: 5.89 X10E6/UL (ref 4.14–5.8)
RBC #/AREA URNS HPF: NORMAL /HPF
SODIUM SERPL-SCNC: 141 MMOL/L (ref 134–144)
SP GR UR: 1.03 (ref 1–1.03)
TRIGL SERPL-MCNC: 204 MG/DL (ref 0–149)
UROBILINOGEN UR STRIP-MCNC: 0.2 MG/DL (ref 0.2–1)
VLDLC SERPL CALC-MCNC: 41 MG/DL (ref 5–40)
WBC # BLD AUTO: 5.2 X10E3/UL (ref 3.4–10.8)
WBC #/AREA URNS HPF: NORMAL /HPF

## 2019-05-16 ENCOUNTER — TELEPHONE (OUTPATIENT)
Dept: INTERNAL MEDICINE CLINIC | Age: 59
End: 2019-05-16

## 2019-05-16 NOTE — TELEPHONE ENCOUNTER
Patient called stating he needs another doctor for his colon, because appt he was given Dr. Loreto Boo does not take his insurance. his number is [phone].

## 2019-05-21 DIAGNOSIS — Z12.11 SCREEN FOR COLON CANCER: Primary | ICD-10-CM

## 2019-06-20 ENCOUNTER — OFFICE VISIT (OUTPATIENT)
Dept: INTERNAL MEDICINE CLINIC | Age: 59
End: 2019-06-20

## 2019-06-20 VITALS
OXYGEN SATURATION: 95 % | TEMPERATURE: 97.8 F | HEART RATE: 89 BPM | WEIGHT: 244.7 LBS | SYSTOLIC BLOOD PRESSURE: 138 MMHG | BODY MASS INDEX: 35.03 KG/M2 | RESPIRATION RATE: 18 BRPM | DIASTOLIC BLOOD PRESSURE: 80 MMHG | HEIGHT: 70 IN

## 2019-06-20 DIAGNOSIS — I10 ESSENTIAL HYPERTENSION: Primary | ICD-10-CM

## 2019-06-20 DIAGNOSIS — E66.01 SEVERE OBESITY (HCC): ICD-10-CM

## 2019-06-20 DIAGNOSIS — I63.312 CEREBROVASCULAR ACCIDENT (CVA) DUE TO THROMBOSIS OF LEFT MIDDLE CEREBRAL ARTERY (HCC): ICD-10-CM

## 2019-06-20 DIAGNOSIS — E11.21 TYPE 2 DIABETES WITH NEPHROPATHY (HCC): ICD-10-CM

## 2019-06-20 NOTE — PROGRESS NOTES
SPORTS MEDICINE AND PRIMARY CARE  Aylin Rockwell MD, [address] [facility],[address] [address]  Phone:  [phone]  Fax: [phone]       Chief Complaint   Patient presents with    Hypertension     f/u   . SUBJECTIVE:    Petra Brito is a 62 y.o. male Patient with a known history of DM with nephropathy, hypertension, obesity, CVA, with left hemiparesis, and is seen for evaluation. Patient denies new complaints and is seen for evaluation. He did not get the colonoscopy by Dr. Steve Rodarte and was not in the network and has an appointment to have one on the 12th next month at [facility].  Patient continues to work, he is doing well and is seen for evaluation. Current Outpatient Medications   Medication Sig Dispense Refill    amLODIPine (NORVASC) 5 mg tablet Take 1 Tab by mouth daily. 30 Tab 11    lisinopril-hydroCHLOROthiazide (PRINZIDE, ZESTORETIC) 20-12.5 mg per tablet TAKE ONE TABLET BY MOUTH ONCE DAILY 30 Tab 35    metoprolol succinate (TOPROL-XL) 25 mg XL tablet Take 1 Tab by mouth daily. 30 Tab 11    metFORMIN ER (GLUCOPHAGE XR) 500 mg tablet TAKE TWO TABLETS BY MOUTH TWICE DAILY WITH MEALS 120 Tab 11    sildenafil citrate (VIAGRA) 100 mg tablet Take 1 Tab by mouth as needed. 10 Tab 11    atorvastatin (LIPITOR) 40 mg tablet Take 1 Tab by mouth daily. 90 Tab 3    insulin NPH/insulin regular (NOVOLIN 70/30, HUMULIN 70/30) 100 unit/mL (70-30) injection 8 Units by SubCUTAneous route Daily (before breakfast). 10 mL 11    aspirin 81 mg chewable tablet Take 1 Tab by mouth daily. 100 Tab 11     Past Medical History:   Diagnosis Date    Anemia     DM (diabetes mellitus) ([address]     HTN (hypertension)     Preventative health care     Right knee gives way     Scrotal wall abscess     Stroke ([facility] [address] 11/29/2017    l hemiparesis     History reviewed. No pertinent surgical history.   No Known Allergies      REVIEW OF SYSTEMS:  General: negative for - chills or fever  ENT: negative for - headaches, nasal congestion or tinnitus  Respiratory: negative for - cough, hemoptysis, shortness of breath or wheezing  Cardiovascular : negative for - chest pain, edema, palpitations or shortness of breath  Gastrointestinal: negative for - abdominal pain, blood in stools, heartburn or nausea/vomiting  Genito-Urinary: no dysuria, trouble voiding, or hematuria  Musculoskeletal: negative for - gait disturbance, joint pain, joint stiffness or joint swelling  Neurological: no TIA or stroke symptoms  Hematologic: no bruises, no bleeding, no swollen glands  Integument: no lumps, mole changes, nail changes or rash  Endocrine: no malaise/lethargy or unexpected weight changes      Social History     Socioeconomic History    Marital status:      Spouse name: Not on file    Number of children: Not on file    Years of education: Not on file    Highest education level: Not on file   Tobacco Use    Smoking status: Never Smoker    Smokeless tobacco: Never Used   Substance and Sexual Activity    Alcohol use: No     Comment: occassionally    Drug use: No    Sexual activity: Yes     Partners: Female     Birth control/protection: None   Social History Narrative    ** Merged History Encounter **     Habits: There is no history of cigarette abuse, occasional alcohol use. No drug abuse.           Social History:  Patient is  15 - Zo Coreas. He has no children and another person stays with him. 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 Mother was 76 and complications of diabetes. He has two brothers and one sister who are alive and well.      Family History   Problem Relation Age of Onset    Diabetes Mother     Heart Disease Father        OBJECTIVE:    Visit Vitals  /80   Pulse 89   Temp 97.8 °F (36.6 °C) (Oral)   Resp 18   Ht 5' 10\" (1.778 m)   Wt 244 lb 11.2 oz (111 kg)   SpO2 95%   BMI 35.11 kg/m²     CONSTITUTIONAL: well , well nourished, appears age appropriate  EYES: perrla, eom intact  ENMT:moist mucous membranes, pharynx clear  NECK: supple. Thyroid normal  RESPIRATORY: Chest: clear bilaterally   CARDIOVASCULAR: Heart: regular rate and rhythm  GASTROINTESTINAL: Abdomen: soft, bowel sounds active  HEMATOLOGIC: no pathological lymph nodes palpated  MUSCULOSKELETAL: Extremities: no edema, pulse 1+   INTEGUMENT: No unusual rashes or suspicious skin lesions noted. Nails appear normal.  NEUROLOGIC: non-focal exam   MENTAL STATUS: alert and oriented, appropriate affect           ASSESSMENT:  1. Essential hypertension    2. Severe obesity ([address]    3. Cerebrovascular accident (CVA) due to thrombosis of left middle cerebral artery ([facility] [address]    4. Type 2 diabetes with nephropathy ([facility] [address]      I am quite pleased with his progress. BMI of course is problematic. He decided to gain 3 lbs since we last saw him. However, I suspect that as the summer goes on with the heat, he will lose the desire to eat and increase the desire to be physically active and will allow the weight to come down at a more reasonable level. Repeat BP is 138/80 and is probably a little lower than that as he did not take the Metoprolol this morning. We have been pleased with his BP outside the office. Will assess blood sugar control with a hemoglobin A1c. Will also look at his renal status and send him the results in the mail to him. He will be back to see us in about four months, sooner if he has any problems. I have discussed the diagnosis with the patient and the intended plan as seen in the  orders above. The patient understands and agees with the plan. The patient has   received an after visit summary and questions were answered concerning  future plans  Patient labs and/or xrays were reviewed  Past records were reviewed.     PLAN:  .  Orders Placed This Encounter    MICROALBUMIN, UR, RAND W/ MICROALB/CREAT RATIO    HEMOGLOBIN A1C WITH EAG    RENAL FUNCTION PANEL       Follow-up and Dispositions    · Return in about 4 months (around 10/20/2019). ATTENTION:   This medical record was transcribed using an electronic medical records system. Although proofread, it may and can contain electronic and spelling errors. Other human spelling and other errors may be present. Corrections may be executed at a later time. Please feel free to contact us for any clarifications as needed.

## 2019-06-20 NOTE — PROGRESS NOTES
1. Have you been to the ER, urgent care clinic since your last visit? Hospitalized since your last visit? No    2. Have you seen or consulted any other health care providers outside of the [address] [place] [address] since your last visit? Include any pap smears or colon screening.  No

## 2019-06-28 LAB
ALBUMIN SERPL-MCNC: 4.7 G/DL (ref 3.6–4.8)
ALBUMIN/CREAT UR: 62.5 MG/G CREAT (ref 0–30)
BUN SERPL-MCNC: 17 MG/DL (ref 8–27)
BUN/CREAT SERPL: 19 (ref 10–24)
CALCIUM SERPL-MCNC: 10 MG/DL (ref 8.6–10.2)
CHLORIDE SERPL-SCNC: 103 MMOL/L (ref 96–106)
CO2 SERPL-SCNC: 23 MMOL/L (ref 20–29)
CREAT SERPL-MCNC: 0.91 MG/DL (ref 0.76–1.27)
CREAT UR-MCNC: 102.2 MG/DL
GLUCOSE SERPL-MCNC: 159 MG/DL (ref 65–99)
HBA1C MFR BLD: NORMAL %
MICROALBUMIN UR-MCNC: 63.9 UG/ML
PHOSPHATE SERPL-MCNC: 2.7 MG/DL (ref 2.5–4.5)
POTASSIUM SERPL-SCNC: 4.6 MMOL/L (ref 3.5–5.2)
SODIUM SERPL-SCNC: 142 MMOL/L (ref 134–144)

## 2019-09-04 RX ORDER — ATORVASTATIN CALCIUM 40 MG/1
TABLET, FILM COATED ORAL
Qty: 30 TAB | Refills: 11 | Status: SHIPPED | OUTPATIENT
Start: 2019-09-04 | End: 2020-06-26

## 2019-09-15 RX ORDER — SILDENAFIL 100 MG/1
TABLET, FILM COATED ORAL
Qty: 10 TAB | Refills: 11 | Status: SHIPPED | OUTPATIENT
Start: 2019-09-15 | End: 2020-12-27

## 2019-10-24 ENCOUNTER — OFFICE VISIT (OUTPATIENT)
Dept: INTERNAL MEDICINE CLINIC | Age: 59
End: 2019-10-24

## 2019-10-24 VITALS
DIASTOLIC BLOOD PRESSURE: 84 MMHG | HEIGHT: 70 IN | BODY MASS INDEX: 34.66 KG/M2 | SYSTOLIC BLOOD PRESSURE: 160 MMHG | HEART RATE: 77 BPM | RESPIRATION RATE: 16 BRPM | WEIGHT: 242.1 LBS | OXYGEN SATURATION: 96 % | TEMPERATURE: 98.8 F

## 2019-10-24 DIAGNOSIS — I63.312 CEREBROVASCULAR ACCIDENT (CVA) DUE TO THROMBOSIS OF LEFT MIDDLE CEREBRAL ARTERY (HCC): ICD-10-CM

## 2019-10-24 DIAGNOSIS — Z23 ENCOUNTER FOR IMMUNIZATION: ICD-10-CM

## 2019-10-24 DIAGNOSIS — E11.21 TYPE 2 DIABETES WITH NEPHROPATHY (HCC): Primary | ICD-10-CM

## 2019-10-24 DIAGNOSIS — D64.9 ANEMIA, UNSPECIFIED TYPE: ICD-10-CM

## 2019-10-24 DIAGNOSIS — E66.01 SEVERE OBESITY (HCC): ICD-10-CM

## 2019-10-24 DIAGNOSIS — I10 ESSENTIAL HYPERTENSION: ICD-10-CM

## 2019-10-24 NOTE — PROGRESS NOTES
1. Have you been to the ER, urgent care clinic since your last visit? Hospitalized since your last visit? No    2. Have you seen or consulted any other health care providers outside of the [address] [place] [address] since your last visit? Include any pap smears or colon screening.  No

## 2019-10-24 NOTE — PROGRESS NOTES
SPORTS MEDICINE AND PRIMARY CARE  Herman Taylor MD, [address] [address]  Phone:  [phone]  Fax: [phone]       Chief Complaint   Patient presents with    Diabetes   . SUBJECTIVE:    Aubrie Gracia is a 62 y.o. male Patient returns today with known history of primary hypertension, CVA with left hemiparesis, type 2 diabetes with nephropathy, obesity, and anemia and is seen for evaluation. Patient returns today without new complaints and is seen for evaluation. He is taking medications regularly. Current Outpatient Medications   Medication Sig Dispense Refill    sildenafil citrate (VIAGRA) 100 mg tablet TAKE ONE TABLET BY MOUTH APPROXIMATELY ONE HOUR BEFORE SEXUAL ACTIVITY. DO NOT USE MORE THAN ONE DOSE DAILY 10 Tab 11    atorvastatin (LIPITOR) 40 mg tablet TAKE 1 TABLET BY MOUTH ONCE DAILY 30 Tab 11    amLODIPine (NORVASC) 5 mg tablet Take 1 Tab by mouth daily. 30 Tab 11    lisinopril-hydroCHLOROthiazide (PRINZIDE, ZESTORETIC) 20-12.5 mg per tablet TAKE ONE TABLET BY MOUTH ONCE DAILY 30 Tab 35    metoprolol succinate (TOPROL-XL) 25 mg XL tablet Take 1 Tab by mouth daily. 30 Tab 11    metFORMIN ER (GLUCOPHAGE XR) 500 mg tablet TAKE TWO TABLETS BY MOUTH TWICE DAILY WITH MEALS 120 Tab 11    insulin NPH/insulin regular (NOVOLIN 70/30, HUMULIN 70/30) 100 unit/mL (70-30) injection 8 Units by SubCUTAneous route Daily (before breakfast). 10 mL 11    aspirin 81 mg chewable tablet Take 1 Tab by mouth daily. 100 Tab 11     Past Medical History:   Diagnosis Date    Anemia     [facility]) [facility]     HTN (hypertension)     Preventative health care     Right knee gives way     Scrotal wall abscess     Stroke ([facility] 11/29/2017    l hemiparesis     History reviewed. No pertinent surgical history.   No Known Allergies      REVIEW OF SYSTEMS:  General: negative for - chills or fever  ENT: negative for - headaches, nasal congestion or tinnitus  Respiratory: negative for - cough, hemoptysis, shortness of breath or wheezing  Cardiovascular : negative for - chest pain, edema, palpitations or shortness of breath  Gastrointestinal: negative for - abdominal pain, blood in stools, heartburn or nausea/vomiting  Genito-Urinary: no dysuria, trouble voiding, or hematuria  Musculoskeletal: negative for - gait disturbance, joint pain, joint stiffness or joint swelling  Neurological: no TIA or stroke symptoms  Hematologic: no bruises, no bleeding, no swollen glands  Integument: no lumps, mole changes, nail changes or rash  Endocrine: no malaise/lethargy or unexpected weight changes      Social History     Socioeconomic History    Marital status:      Spouse name: Not on file    Number of children: Not on file    Years of education: Not on file    Highest education level: Not on file   Tobacco Use    Smoking status: Never Smoker    Smokeless tobacco: Never Used   Substance and Sexual Activity    Alcohol use: No     Comment: occassionally    Drug use: No    Sexual activity: Yes     Partners: Female     Birth control/protection: None   Social History Narrative    ** Merged History Encounter **     Habits: There is no history of cigarette abuse, occasional alcohol use. No drug abuse.           Social History:  Patient is  15 - . He has no children and another person stays with him. 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         Family History:  Father  at 67 related to congestive heart failure. Mother was 76 and complications of diabetes. He has two brothers and one sister who are alive and well.      Family History   Problem Relation Age of Onset    Diabetes Mother     Heart Disease Father        OBJECTIVE:    Visit Vitals  /84   Pulse 77   Temp 98.8 °F (37.1 °C) (Oral)   Resp 16   Ht 5' 10\" (1.778 m)   Wt 242 lb 1.6 oz (109.8 kg)   SpO2 96%   BMI 34.74 kg/m²     CONSTITUTIONAL: well , well nourished, appears age appropriate  EYES: perrla, eom intact  ENMT:moist mucous membranes, pharynx clear  NECK: supple. Thyroid normal  RESPIRATORY: Chest: clear bilaterally   CARDIOVASCULAR: Heart: regular rate and rhythm  GASTROINTESTINAL: Abdomen: soft, bowel sounds active  HEMATOLOGIC: no pathological lymph nodes palpated  MUSCULOSKELETAL: Extremities: no edema, pulse 1+   INTEGUMENT: No unusual rashes or suspicious skin lesions noted. Nails appear normal.  NEUROLOGIC: non-focal exam   MENTAL STATUS: alert and oriented, appropriate affect           ASSESSMENT:  1. Type 2 diabetes with nephropathy ([address]    2. Encounter for immunization    3. Essential hypertension    4. Cerebrovascular accident (CVA) due to thrombosis of left middle cerebral artery ([address]    5. Severe obesity ([address]    6. Anemia, unspecified type      Known history of diabetes mellitus with nephropathy and insulin requiring. His last GFR, however, was 105, normal renal function, and nephropathy manifest by an elevated microalbumin/creatinine ratio. Blood sugars have been running generally in an appropriate range. He received his flu shot today. We encouraged him to get the shingles shot series. Blood pressure was a little high when he first came in. He was sitting for a bit, so will check his blood pressure. Blank 2 second file. Repeat BP is 144/76, which is significantly improved. I think it will return to normal after he gets out of the office. He will check it and let us know if it is not. Little residual from the CEA. We remain concerned about his category of obesity by BMI. Since we last saw him he has decided to lose 2 pounds, although I think he could be a little more aggressive, and he tells me he is going to start working out. We advise him that it is the diet that is making a difference. He will be back to see us in about three months, sooner if he needs to. Will call him if we can decrease the insulin. The other thought is to use a GLP-1 inhibitor. I have discussed the diagnosis with the patient and the intended plan as seen in the  orders above. The patient understands and agees with the plan. The patient has   received an after visit summary and questions were answered concerning  future plans  Patient labs and/or xrays were reviewed  Past records were reviewed. PLAN:  .  Orders Placed This Encounter    Influenza virus vaccine (QUADRIVALENT PRES FREE SYRINGE) IM ([id number])    HEMOGLOBIN A1C WITH EAG       Follow-up and Dispositions    · Return in about 3 months (around 1/24/2020). ATTENTION:   This medical record was transcribed using an electronic medical records system. Although proofread, it may and can contain electronic and spelling errors. Other human spelling and other errors may be present. Corrections may be executed at a later time. Please feel free to contact us for any clarifications as needed.

## 2019-10-25 LAB
EST. AVERAGE GLUCOSE BLD GHB EST-MCNC: 154 MG/DL
HBA1C MFR BLD: 7 % (ref 4.8–5.6)

## 2019-12-01 RX ORDER — METFORMIN HYDROCHLORIDE 500 MG/1
TABLET, EXTENDED RELEASE ORAL
Qty: 120 TAB | Refills: 11 | Status: SHIPPED | OUTPATIENT
Start: 2019-12-01 | End: 2020-09-27

## 2020-01-30 ENCOUNTER — OFFICE VISIT (OUTPATIENT)
Dept: INTERNAL MEDICINE CLINIC | Age: 60
End: 2020-01-30

## 2020-01-30 VITALS
WEIGHT: 247.2 LBS | HEART RATE: 82 BPM | RESPIRATION RATE: 18 BRPM | DIASTOLIC BLOOD PRESSURE: 80 MMHG | TEMPERATURE: 97.9 F | BODY MASS INDEX: 35.39 KG/M2 | HEIGHT: 70 IN | SYSTOLIC BLOOD PRESSURE: 150 MMHG | OXYGEN SATURATION: 94 %

## 2020-01-30 DIAGNOSIS — D64.9 ANEMIA, UNSPECIFIED TYPE: ICD-10-CM

## 2020-01-30 DIAGNOSIS — E11.21 TYPE 2 DIABETES WITH NEPHROPATHY (HCC): Primary | ICD-10-CM

## 2020-01-30 DIAGNOSIS — I63.312 CEREBROVASCULAR ACCIDENT (CVA) DUE TO THROMBOSIS OF LEFT MIDDLE CEREBRAL ARTERY (HCC): ICD-10-CM

## 2020-01-30 DIAGNOSIS — I10 ESSENTIAL HYPERTENSION: ICD-10-CM

## 2020-01-30 DIAGNOSIS — E66.01 SEVERE OBESITY (HCC): ICD-10-CM

## 2020-01-30 RX ORDER — METOPROLOL SUCCINATE 50 MG/1
50 TABLET, EXTENDED RELEASE ORAL
Qty: 30 TAB | Refills: 11 | Status: SHIPPED | OUTPATIENT
Start: 2020-01-30 | End: 2021-01-09

## 2020-01-30 RX ORDER — AMLODIPINE BESYLATE 10 MG/1
5 TABLET ORAL DAILY
Qty: 30 TAB | Refills: 11 | Status: SHIPPED | OUTPATIENT
Start: 2020-01-30 | End: 2021-01-17 | Stop reason: SDUPTHER

## 2020-01-30 NOTE — PROGRESS NOTES
SPORTS MEDICINE AND PRIMARY CARE  Александр Kraft MD, [address] [address]  Phone:  [phone]  Fax: [phone]       Chief Complaint   Patient presents with    Diabetes   . SUBJECTIVE:    Nico Hilton is a 62 y.o. male Patient returns today with known history of type 2 diabetes with nephropathy, CVA, obesity, primary hypertension, anemia, and is seen for evaluation. He voices no new complaints. He continues to be gainfully employed and enjoying his job. Patient is seen for evaluation. Current Outpatient Medications   Medication Sig Dispense Refill    amLODIPine (NORVASC) 10 mg tablet Take 0.5 Tabs by mouth daily. 30 Tab 11    metoprolol succinate (TOPROL-XL) 50 mg XL tablet Take 1 Tab by mouth nightly. 30 Tab 11    metFORMIN ER (GLUCOPHAGE XR) 500 mg tablet TAKE 2 TABLETS BY MOUTH TWICE DAILY WITH MEALS 120 Tab 11    sildenafil citrate (VIAGRA) 100 mg tablet TAKE ONE TABLET BY MOUTH APPROXIMATELY ONE HOUR BEFORE SEXUAL ACTIVITY. DO NOT USE MORE THAN ONE DOSE DAILY 10 Tab 11    atorvastatin (LIPITOR) 40 mg tablet TAKE 1 TABLET BY MOUTH ONCE DAILY 30 Tab 11    lisinopril-hydroCHLOROthiazide (PRINZIDE, ZESTORETIC) 20-12.5 mg per tablet TAKE ONE TABLET BY MOUTH ONCE DAILY 30 Tab 35    insulin NPH/insulin regular (NOVOLIN 70/30, HUMULIN 70/30) 100 unit/mL (70-30) injection 8 Units by SubCUTAneous route Daily (before breakfast). 10 mL 11    aspirin 81 mg chewable tablet Take 1 Tab by mouth daily. 100 Tab 11     Past Medical History:   Diagnosis Date    Anemia     DM (diabetes mellitus) ([address]     HTN (hypertension)     Preventative health care     Right knee gives way     Scrotal wall abscess     Stroke ([facility] [address] 11/29/2017    l hemiparesis     History reviewed. No pertinent surgical history.   No Known Allergies      REVIEW OF SYSTEMS:  General: negative for - chills or fever  ENT: negative for - headaches, nasal congestion or tinnitus  Respiratory: negative for - cough, hemoptysis, shortness of breath or wheezing  Cardiovascular : negative for - chest pain, edema, palpitations or shortness of breath  Gastrointestinal: negative for - abdominal pain, blood in stools, heartburn or nausea/vomiting  Genito-Urinary: no dysuria, trouble voiding, or hematuria  Musculoskeletal: negative for - gait disturbance, joint pain, joint stiffness or joint swelling  Neurological: no TIA or stroke symptoms  Hematologic: no bruises, no bleeding, no swollen glands  Integument: no lumps, mole changes, nail changes or rash  Endocrine: no malaise/lethargy or unexpected weight changes      Social History     Socioeconomic History    Marital status:      Spouse name: Not on file    Number of children: Not on file    Years of education: Not on file    Highest education level: Not on file   Tobacco Use    Smoking status: Never Smoker    Smokeless tobacco: Never Used   Substance and Sexual Activity    Alcohol use: No     Comment: occassionally    Drug use: No    Sexual activity: Yes     Partners: Female     Birth control/protection: None   Social History Narrative    ** Merged History Encounter **     Habits: There is no history of cigarette abuse, occasional alcohol use. No drug abuse.           Social History:  Patient is  15 - [address]. He has no children and another person stays with him. 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 [place]         Family History:  Father  at 67 related to congestive heart failure. Mother was 76 and complications of diabetes. He has two brothers and one sister who are alive and well.      Family History   Problem Relation Age of Onset    Diabetes Mother     Heart Disease Father        OBJECTIVE:    Visit Vitals  /80   Pulse 82   Temp 97.9 °F (36.6 °C) (Oral)   Resp 18   Ht 5' 10\" (1.778 m)   Wt 247 lb 3.2 oz (112.1 kg)   SpO2 94%   BMI 35.47 kg/m²     CONSTITUTIONAL: well , well nourished, appears age appropriate  EYES: perrla, eom intact  ENMT:moist mucous membranes, pharynx clear  NECK: supple. Thyroid normal  RESPIRATORY: Chest: clear bilaterally   CARDIOVASCULAR: Heart: regular rate and rhythm  GASTROINTESTINAL: Abdomen: soft, bowel sounds active  HEMATOLOGIC: no pathological lymph nodes palpated  MUSCULOSKELETAL: Extremities: no edema, pulse 1+   INTEGUMENT: No unusual rashes or suspicious skin lesions noted. Nails appear normal.  NEUROLOGIC: non-focal exam   MENTAL STATUS: alert and oriented, appropriate affect           ASSESSMENT:  1. Type 2 diabetes with nephropathy ([facility] [address]    2. Cerebrovascular accident (CVA) due to thrombosis of left middle cerebral artery ([facility] [address]    3. Severe obesity ([facility] [address]    4. Essential hypertension    5. Anemia, unspecified type      Blood pressure control is lacking, at home it is in the 140s and 150s. Therefore, we will increase the Metoprolol to 50 mg and ask him to take that at bedtime, increase the Amlodipine to 10 mg q.a.m. He checks his blood pressure at home. If it drops down into the 120s then we have reached goal; if it stays in the 140s-150s, we have not reached goal and he will give us a call and we will add another agent, probably Hydralazine. If it drops in the low single digits, between 100-110, he will also call us and we will make an adjustment. Blood sugar control has been fairly good over the past several months. Will check hemoglobin A1c for verification. His last hemoglobin A1c was 7.0. Known history of CVA. This makes us want to be sure to keep his blood pressure in the normotensive range and keep his hemoglobin A1c as close to 6.5 as humanly possible. To that end, to decrease the risk of recurrent strokes, if we can somehow persuade him to work out for 30 minutes five days a week, we will decrease that incident, as well as incidents of cancer.     The obesity is contributing to his diabetes and his blood pressure control and certainly we would encourage a heart healthy, diabetic, weight reducing diet, particularly in view of the fact that he made a decision to gain 5 pounds since we last saw him. He has a history of anemia, but as I recall that is very stable. His last hemoglobin was 13.2, indicating no anemia. He will be back to see us in three months, sooner if he has any problems. Discussed the patient's BMI with him. The BMI follow up plan is as follows:     dietary management education, guidance, and counseling  encourage exercise  monitor weight  prescribed dietary intake    I have discussed the diagnosis with the patient and the intended plan as seen in the  orders above. The patient understands and agees with the plan. The patient has   received an after visit summary and questions were answered concerning  future plans  Patient labs and/or xrays were reviewed  Past records were reviewed. PLAN:  .  Orders Placed This Encounter    HEMOGLOBIN A1C WITH EAG    amLODIPine (NORVASC) 10 mg tablet    metoprolol succinate (TOPROL-XL) 50 mg XL tablet       Follow-up and Dispositions    · Return in about 3 months (around 4/30/2020). ATTENTION:   This medical record was transcribed using an electronic medical records system. Although proofread, it may and can contain electronic and spelling errors. Other human spelling and other errors may be present. Corrections may be executed at a later time. Please feel free to contact us for any clarifications as needed.

## 2020-01-30 NOTE — ASSESSMENT & PLAN NOTE
Stable, based on history, physical exam and review of pertinent labs, studies and medications; meds reconciled; continue current treatment plan. Key Antihyperglycemic Medications             metFORMIN ER (GLUCOPHAGE XR) 500 mg tablet (Taking) TAKE 2 TABLETS BY MOUTH TWICE DAILY WITH MEALS    insulin NPH/insulin regular (NOVOLIN 70/30, HUMULIN 70/30) 100 unit/mL (70-30) injection (Taking) 8 Units by SubCUTAneous route Daily (before breakfast).         Other Key Diabetic Medications             atorvastatin (LIPITOR) 40 mg tablet (Taking) TAKE 1 TABLET BY MOUTH ONCE DAILY    lisinopril-hydroCHLOROthiazide (PRINZIDE, ZESTORETIC) 20-12.5 mg per tablet (Taking) TAKE ONE TABLET BY MOUTH ONCE DAILY        Lab Results   Component Value Date/Time    Hemoglobin A1c 7.0 10/24/2019 01:23 PM    Glucose 159 06/20/2019 01:09 PM    Creatinine 0.91 06/20/2019 01:09 PM    Microalb/Creat ratio (ug/mg creat.) 62.5 06/20/2019 01:09 PM    Cholesterol, total 126 03/14/2019 12:32 PM    HDL Cholesterol 50 03/14/2019 12:32 PM    LDL, calculated 35 03/14/2019 12:32 PM    Triglyceride 204 03/14/2019 12:32 PM     Diabetic Foot and Eye Exam HM Status   Topic Date Due    Diabetic Foot Care  03/14/2020

## 2020-01-30 NOTE — ASSESSMENT & PLAN NOTE
Stable, based on history, physical exam and review of pertinent labs, studies and medications; meds reconciled; continue current treatment plan.   Key Obesity Meds             metFORMIN ER (GLUCOPHAGE XR) 500 mg tablet (Taking) TAKE 2 TABLETS BY MOUTH TWICE DAILY WITH MEALS        Lab Results   Component Value Date/Time    Hemoglobin A1c 7.0 10/24/2019 01:23 PM    Glucose 159 06/20/2019 01:09 PM    Cholesterol, total 126 03/14/2019 12:32 PM    HDL Cholesterol 50 03/14/2019 12:32 PM    LDL, calculated 35 03/14/2019 12:32 PM    Triglyceride 204 03/14/2019 12:32 PM    Sodium 142 06/20/2019 01:09 PM    Potassium 4.6 06/20/2019 01:09 PM    ALT (SGPT) 28 03/14/2019 12:32 PM    AST (SGOT) 23 03/14/2019 12:32 PM

## 2020-01-30 NOTE — ASSESSMENT & PLAN NOTE
Stable, based on history, physical exam and review of pertinent labs, studies and medications; meds reconciled; continue current treatment plan. Key Neurological Meds             aspirin 81 mg chewable tablet (Taking) Take 1 Tab by mouth daily.         Lab Results   Component Value Date/Time    WBC 5.2 03/14/2019 12:32 PM    HGB 13.2 03/14/2019 12:32 PM    HCT 42.0 03/14/2019 12:32 PM    PLATELET 716 38/22/9176 12:32 PM    Creatinine 0.91 06/20/2019 01:09 PM    BUN 17 06/20/2019 01:09 PM

## 2020-01-30 NOTE — PATIENT INSTRUCTIONS
Body Mass Index: Care Instructions Your Care Instructions Body mass index (BMI) can help you see if your weight is raising your risk for health problems. It uses a formula to compare how much you weigh with how tall you are. · A BMI lower than 18.5 is considered underweight. · A BMI between 18.5 and 24.9 is considered healthy. · A BMI between 25 and 29.9 is considered overweight. A BMI of 30 or higher is considered obese. If your BMI is in the normal range, it means that you have a lower risk for weight-related health problems. If your BMI is in the overweight or obese range, you may be at increased risk for weight-related health problems, such as high blood pressure, heart disease, stroke, arthritis or joint pain, and diabetes. If your BMI is in the underweight range, you may be at increased risk for health problems such as fatigue, lower protection (immunity) against illness, muscle loss, bone loss, hair loss, and hormone problems. BMI is just one measure of your risk for weight-related health problems. You may be at higher risk for health problems if you are not active, you eat an unhealthy diet, or you drink too much alcohol or use tobacco products. Follow-up care is a key part of your treatment and safety. Be sure to make and go to all appointments, and call your doctor if you are having problems. It's also a good idea to know your test results and keep a list of the medicines you take. How can you care for yourself at home? · Practice healthy eating habits. This includes eating plenty of fruits, vegetables, whole grains, lean protein, and low-fat dairy. · If your doctor recommends it, get more exercise. Walking is a good choice. Bit by bit, increase the amount you walk every day. Try for at least 30 minutes on most days of the week. · Do not smoke. Smoking can increase your risk for health problems.  If you need help quitting, talk to your doctor about stop-smoking programs and medicines. These can increase your chances of quitting for good. · Limit alcohol to 2 drinks a day for men and 1 drink a day for women. Too much alcohol can cause health problems. If you have a BMI higher than 25 · Your doctor may do other tests to check your risk for weight-related health problems. This may include measuring the distance around your waist. A waist measurement of more than 40 inches in men or 35 inches in women can increase the risk of weight-related health problems. · Talk with your doctor about steps you can take to stay healthy or improve your health. You may need to make lifestyle changes to lose weight and stay healthy, such as changing your diet and getting regular exercise. If you have a BMI lower than 18.5 · Your doctor may do other tests to check your risk for health problems. · Talk with your doctor about steps you can take to stay healthy or improve your health. You may need to make lifestyle changes to gain or maintain weight and stay healthy, such as getting more healthy foods in your diet and doing exercises to build muscle. Where can you learn more? Go to http://jhonny-amari.info/. Enter S176 in the search box to learn more about \"Body Mass Index: Care Instructions. \" Current as of: October 13, 2016 Content Version: 11.4 © 5643-8710 [employer]. Care instructions adapted under license by [employer] (which disclaims liability or warranty for this information). If you have questions about a medical condition or this instruction, always ask your healthcare professional. [address] any warranty or liability for your use of this information.

## 2020-01-30 NOTE — PROGRESS NOTES
1. Have you been to the ER, urgent care clinic since your last visit? Hospitalized since your last visit? No    2. Have you seen or consulted any other health care providers outside of the [address] [place] [address] since your last visit? Include any pap smears or colon screening.  No

## 2020-01-31 LAB
EST. AVERAGE GLUCOSE BLD GHB EST-MCNC: 154 MG/DL
HBA1C MFR BLD: 7 % (ref 4.8–5.6)

## 2020-02-08 RX ORDER — LISINOPRIL AND HYDROCHLOROTHIAZIDE 12.5; 2 MG/1; MG/1
TABLET ORAL
Qty: 30 TAB | Refills: 0 | Status: SHIPPED | OUTPATIENT
Start: 2020-02-08 | End: 2020-03-09

## 2020-03-17 RX ORDER — AMLODIPINE BESYLATE 5 MG/1
TABLET ORAL
Qty: 30 TAB | Refills: 11 | Status: SHIPPED | OUTPATIENT
Start: 2020-03-17 | End: 2021-01-17

## 2020-06-04 ENCOUNTER — OFFICE VISIT (OUTPATIENT)
Dept: INTERNAL MEDICINE CLINIC | Age: 60
End: 2020-06-04

## 2020-06-04 VITALS
WEIGHT: 249.5 LBS | RESPIRATION RATE: 18 BRPM | HEIGHT: 70 IN | TEMPERATURE: 98.4 F | SYSTOLIC BLOOD PRESSURE: 147 MMHG | DIASTOLIC BLOOD PRESSURE: 78 MMHG | BODY MASS INDEX: 35.72 KG/M2 | OXYGEN SATURATION: 96 % | HEART RATE: 80 BPM

## 2020-06-04 DIAGNOSIS — I63.312 CEREBROVASCULAR ACCIDENT (CVA) DUE TO THROMBOSIS OF LEFT MIDDLE CEREBRAL ARTERY (HCC): ICD-10-CM

## 2020-06-04 DIAGNOSIS — E66.01 SEVERE OBESITY (HCC): ICD-10-CM

## 2020-06-04 DIAGNOSIS — I10 ESSENTIAL HYPERTENSION: ICD-10-CM

## 2020-06-04 DIAGNOSIS — E11.21 TYPE 2 DIABETES WITH NEPHROPATHY (HCC): Primary | ICD-10-CM

## 2020-06-04 NOTE — PROGRESS NOTES
SPORTS MEDICINE AND PRIMARY CARE  Eliza Alonzo MD, [address] [address]  Phone:  [phone]  Fax: [phone]       Chief Complaint   Patient presents with    Hypertension   . SUBJECTIVE:    Kamryn Valerio is a 56 y.o. male Patient returns today with known history of diabetes mellitus type 2 with nephropathy, CVA, obesity, primary hypertension and is seen for evaluation. Current Outpatient Medications   Medication Sig Dispense Refill    amLODIPine (NORVASC) 5 mg tablet Take 1 tablet by mouth once daily 30 Tab 11    lisinopril-hydroCHLOROthiazide (PRINZIDE, ZESTORETIC) 20-12.5 mg per tablet Take 1 tablet by mouth once daily 90 Tab 11    metoprolol succinate (TOPROL-XL) 50 mg XL tablet Take 1 Tab by mouth nightly. 30 Tab 11    metFORMIN ER (GLUCOPHAGE XR) 500 mg tablet TAKE 2 TABLETS BY MOUTH TWICE DAILY WITH MEALS 120 Tab 11    sildenafil citrate (VIAGRA) 100 mg tablet TAKE ONE TABLET BY MOUTH APPROXIMATELY ONE HOUR BEFORE SEXUAL ACTIVITY. DO NOT USE MORE THAN ONE DOSE DAILY 10 Tab 11    atorvastatin (LIPITOR) 40 mg tablet TAKE 1 TABLET BY MOUTH ONCE DAILY 30 Tab 11    insulin NPH/insulin regular (NOVOLIN 70/30, HUMULIN 70/30) 100 unit/mL (70-30) injection 8 Units by SubCUTAneous route Daily (before breakfast). 10 mL 11    aspirin 81 mg chewable tablet Take 1 Tab by mouth daily. 100 Tab 11    amLODIPine (NORVASC) 10 mg tablet Take 0.5 Tabs by mouth daily. 27 Tab 11     Past Medical History:   Diagnosis Date    Anemia     [facility]) [facility] [address]     HTN (hypertension)     Preventative health care     Right knee gives way     Scrotal wall abscess     Stroke ([facility] [address] 11/29/2017    l hemiparesis     History reviewed. No pertinent surgical history.   No Known Allergies      REVIEW OF SYSTEMS:  General: negative for - chills or fever  ENT: negative for - headaches, nasal congestion or tinnitus  Respiratory: negative for - cough, hemoptysis, shortness of breath or wheezing  Cardiovascular : negative for - chest pain, edema, palpitations or shortness of breath  Gastrointestinal: negative for - abdominal pain, blood in stools, heartburn or nausea/vomiting  Genito-Urinary: no dysuria, trouble voiding, or hematuria  Musculoskeletal: negative for - gait disturbance, joint pain, joint stiffness or joint swelling  Neurological: no TIA or stroke symptoms  Hematologic: no bruises, no bleeding, no swollen glands  Integument: no lumps, mole changes, nail changes or rash  Endocrine: no malaise/lethargy or unexpected weight changes      Social History     Socioeconomic History    Marital status:      Spouse name: Not on file    Number of children: Not on file    Years of education: Not on file    Highest education level: Not on file   Tobacco Use    Smoking status: Never Smoker    Smokeless tobacco: Never Used   Substance and Sexual Activity    Alcohol use: No     Comment: occassionally    Drug use: No    Sexual activity: Yes     Partners: Female     Birth control/protection: None   Social History Narrative    ** Merged History Encounter **     Habits: There is no history of cigarette abuse, occasional alcohol use. No drug abuse.           Social History:  Patient is  15 - [place] [place]. He has no children and another person stays with him. Patient is an LPN in Psychiatry at [address]         Family History:  Father  at 67 related to congestive heart failure. Mother was 76 and complications of diabetes. He has two brothers and one sister who are alive and well.      Family History   Problem Relation Age of Onset    Diabetes Mother     Heart Disease Father        OBJECTIVE:    Visit Vitals  /78   Pulse 80   Temp 98.4 °F (36.9 °C) (Oral)   Resp 18   Ht 5' 10\" (1.778 m)   Wt 249 lb 8 oz (113.2 kg)   SpO2 96%   BMI 35.80 kg/m²     CONSTITUTIONAL: well , well nourished, appears age appropriate  EYES: perrla, eom intact  ENMT:moist mucous membranes, pharynx clear  NECK: supple. Thyroid normal  RESPIRATORY: Chest: clear bilaterally   CARDIOVASCULAR: Heart: regular rate and rhythm  GASTROINTESTINAL: Abdomen: soft, bowel sounds active  HEMATOLOGIC: no pathological lymph nodes palpated  MUSCULOSKELETAL: Extremities: no edema, pulse 1+ The other part is he needs to decrease his caloric intake. Foot exam reveals no lesions. Pulses are intact. Fine filament sensation is intact. INTEGUMENT: No unusual rashes or suspicious skin lesions noted. Nails appear normal.  NEUROLOGIC: non-focal exam   MENTAL STATUS: alert and oriented, appropriate affect           ASSESSMENT:  1. Type 2 diabetes with nephropathy ([facility] [address]    2. Cerebrovascular accident (CVA) due to thrombosis of left middle cerebral artery ([facility] [address]    3. Severe obesity ([facility] [address]    4. 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 They are not allowed on their unit. Then all employees are screened through the emergency room before they come to work, which is very fortunate for him. He brings in his blood sugar readings since January. There is some variability, but most of them look pretty good. The highest is about 160s and no evidence of significant hypoglycemia, so this should allow him to have a normal hemoglobin A1c. We review his blood pressure readings and they likewise are good and the fluctuations are what you would expect to see day to day with stressors and non stressors occurring with him. For example, blood pressure can be 116/66, which is probably a day that everything is going well, but as high as 187/97, but most of them are in a goal range for blood pressure in the 120s. We congratulate him there. Obesity remains a concern and since we last saw him he has decided to gain another 2 pounds.   Certainly we encourage physical activity 30 minutes five days a week and a heart healthy diet. He will be back to see us in three to four months. Appropriate laboratory studies are requested. I have discussed the diagnosis with the patient and the intended plan as seen in the  orders above. The patient understands and agees with the plan. The patient has   received an after visit summary and questions were answered concerning  future plans  Patient labs and/or xrays were reviewed  Past records were reviewed. PLAN:  .  Orders Placed This Encounter    LIPID PANEL    URINALYSIS W/ RFLX MICROSCOPIC    CBC WITH AUTOMATED DIFF    METABOLIC PANEL, COMPREHENSIVE    PROSTATE SPECIFIC AG    HEMOGLOBIN A1C WITH EAG       Follow-up and Dispositions    · Return in about 4 months (around 10/4/2020). ATTENTION:   This medical record was transcribed using an electronic medical records system. Although proofread, it may and can contain electronic and spelling errors. Other human spelling and other errors may be present. Corrections may be executed at a later time. Please feel free to contact us for any clarifications as needed.

## 2020-06-04 NOTE — PROGRESS NOTES
1. Have you been to the ER, urgent care clinic since your last visit? Hospitalized since your last visit? No    2. Have you seen or consulted any other health care providers outside of the [address] [place] [address] since your last visit? Include any pap smears or colon screening.  No

## 2020-06-05 LAB
ALBUMIN SERPL-MCNC: 4.4 G/DL (ref 3.8–4.8)
ALBUMIN/GLOB SERPL: 2 {RATIO} (ref 1.2–2.2)
ALP SERPL-CCNC: 54 IU/L (ref 39–117)
ALT SERPL-CCNC: 24 IU/L (ref 0–44)
APPEARANCE UR: CLEAR
AST SERPL-CCNC: 30 IU/L (ref 0–40)
BACTERIA #/AREA URNS HPF: NORMAL /[HPF]
BASOPHILS # BLD AUTO: 0 X10E3/UL (ref 0–0.2)
BASOPHILS NFR BLD AUTO: 1 %
BILIRUB SERPL-MCNC: <0.2 MG/DL (ref 0–1.2)
BILIRUB UR QL STRIP: NEGATIVE
BUN SERPL-MCNC: 18 MG/DL (ref 8–27)
BUN/CREAT SERPL: 17 (ref 10–24)
CALCIUM SERPL-MCNC: 10 MG/DL (ref 8.6–10.2)
CASTS URNS QL MICRO: NORMAL /LPF
CHLORIDE SERPL-SCNC: 101 MMOL/L (ref 96–106)
CHOLEST SERPL-MCNC: 116 MG/DL (ref 100–199)
CO2 SERPL-SCNC: 22 MMOL/L (ref 20–29)
COLOR UR: YELLOW
CREAT SERPL-MCNC: 1.05 MG/DL (ref 0.76–1.27)
EOSINOPHIL # BLD AUTO: 0.1 X10E3/UL (ref 0–0.4)
EOSINOPHIL NFR BLD AUTO: 2 %
EPI CELLS #/AREA URNS HPF: NORMAL /HPF (ref 0–10)
ERYTHROCYTE [DISTWIDTH] IN BLOOD BY AUTOMATED COUNT: 16.6 % (ref 11.6–15.4)
EST. AVERAGE GLUCOSE BLD GHB EST-MCNC: 163 MG/DL
GLOBULIN SER CALC-MCNC: 2.2 G/DL (ref 1.5–4.5)
GLUCOSE SERPL-MCNC: 213 MG/DL (ref 65–99)
GLUCOSE UR QL: ABNORMAL
HBA1C MFR BLD: 7.3 % (ref 4.8–5.6)
HCT VFR BLD AUTO: 40.4 % (ref 37.5–51)
HDLC SERPL-MCNC: 43 MG/DL
HGB BLD-MCNC: 12 G/DL (ref 13–17.7)
HGB UR QL STRIP: NEGATIVE
IMM GRANULOCYTES # BLD AUTO: 0 X10E3/UL (ref 0–0.1)
IMM GRANULOCYTES NFR BLD AUTO: 0 %
KETONES UR QL STRIP: ABNORMAL
LDLC SERPL CALC-MCNC: 41 MG/DL (ref 0–99)
LEUKOCYTE ESTERASE UR QL STRIP: NEGATIVE
LYMPHOCYTES # BLD AUTO: 1.6 X10E3/UL (ref 0.7–3.1)
LYMPHOCYTES NFR BLD AUTO: 37 %
MCH RBC QN AUTO: 21.6 PG (ref 26.6–33)
MCHC RBC AUTO-ENTMCNC: 29.7 G/DL (ref 31.5–35.7)
MCV RBC AUTO: 73 FL (ref 79–97)
MICRO URNS: ABNORMAL
MONOCYTES # BLD AUTO: 0.5 X10E3/UL (ref 0.1–0.9)
MONOCYTES NFR BLD AUTO: 11 %
MUCOUS THREADS URNS QL MICRO: PRESENT
NEUTROPHILS # BLD AUTO: 2.1 X10E3/UL (ref 1.4–7)
NEUTROPHILS NFR BLD AUTO: 49 %
NITRITE UR QL STRIP: NEGATIVE
PH UR STRIP: 5 [PH] (ref 5–7.5)
PLATELET # BLD AUTO: 243 X10E3/UL (ref 150–450)
POTASSIUM SERPL-SCNC: 4.6 MMOL/L (ref 3.5–5.2)
PROT SERPL-MCNC: 6.6 G/DL (ref 6–8.5)
PROT UR QL STRIP: ABNORMAL
PSA SERPL-MCNC: 0.5 NG/ML (ref 0–4)
RBC # BLD AUTO: 5.56 X10E6/UL (ref 4.14–5.8)
RBC #/AREA URNS HPF: NORMAL /HPF (ref 0–2)
SODIUM SERPL-SCNC: 140 MMOL/L (ref 134–144)
SP GR UR: >=1.03 (ref 1–1.03)
TRIGL SERPL-MCNC: 161 MG/DL (ref 0–149)
UROBILINOGEN UR STRIP-MCNC: 0.2 MG/DL (ref 0.2–1)
VLDLC SERPL CALC-MCNC: 32 MG/DL (ref 5–40)
WBC # BLD AUTO: 4.3 X10E3/UL (ref 3.4–10.8)
WBC #/AREA URNS HPF: NORMAL /HPF (ref 0–5)

## 2020-06-26 RX ORDER — ATORVASTATIN CALCIUM 40 MG/1
TABLET, FILM COATED ORAL
Qty: 90 TAB | Refills: 1 | Status: SHIPPED | OUTPATIENT
Start: 2020-06-26 | End: 2020-12-25

## 2020-09-27 RX ORDER — METFORMIN HYDROCHLORIDE 500 MG/1
TABLET, EXTENDED RELEASE ORAL
Qty: 360 TAB | Refills: 2 | Status: SHIPPED | OUTPATIENT
Start: 2020-09-27 | End: 2021-06-19

## 2020-11-05 ENCOUNTER — OFFICE VISIT (OUTPATIENT)
Dept: INTERNAL MEDICINE CLINIC | Age: 60
End: 2020-11-05
Payer: COMMERCIAL

## 2020-11-05 VITALS
DIASTOLIC BLOOD PRESSURE: 75 MMHG | RESPIRATION RATE: 18 BRPM | BODY MASS INDEX: 36.13 KG/M2 | TEMPERATURE: 97.9 F | HEIGHT: 70 IN | HEART RATE: 73 BPM | SYSTOLIC BLOOD PRESSURE: 138 MMHG | WEIGHT: 252.4 LBS | OXYGEN SATURATION: 98 %

## 2020-11-05 DIAGNOSIS — D64.9 ANEMIA, UNSPECIFIED TYPE: ICD-10-CM

## 2020-11-05 DIAGNOSIS — E11.21 TYPE 2 DIABETES WITH NEPHROPATHY (HCC): Primary | ICD-10-CM

## 2020-11-05 DIAGNOSIS — I63.312 CEREBROVASCULAR ACCIDENT (CVA) DUE TO THROMBOSIS OF LEFT MIDDLE CEREBRAL ARTERY (HCC): ICD-10-CM

## 2020-11-05 DIAGNOSIS — E66.01 SEVERE OBESITY (HCC): ICD-10-CM

## 2020-11-05 DIAGNOSIS — Z00.00 PREVENTATIVE HEALTH CARE: ICD-10-CM

## 2020-11-05 DIAGNOSIS — I10 ESSENTIAL HYPERTENSION: ICD-10-CM

## 2020-11-05 PROCEDURE — 36415 COLL VENOUS BLD VENIPUNCTURE: CPT | Performed by: INTERNAL MEDICINE

## 2020-11-05 PROCEDURE — 99214 OFFICE O/P EST MOD 30 MIN: CPT | Performed by: INTERNAL MEDICINE

## 2020-11-05 PROCEDURE — 3051F HG A1C>EQUAL 7.0%<8.0%: CPT | Performed by: INTERNAL MEDICINE

## 2020-11-05 NOTE — PROGRESS NOTES
SPORTS MEDICINE AND PRIMARY CARE  Gini Matta MD, [address] [address]  Phone:  [phone]  Fax: [phone]       Chief Complaint   Patient presents with    Hypertension   . SUBJECTIVE:    Juan Jose Pereira is a 56 y.o. male Patient comes in today, doing well. He works at [employer] and apparently they do intensive screening so he does not come into contact with the COVID virus. He has a known history of stroke, anemia, hypertension, type 2 diabetes with nephropathy, obesity, and is seen for evaluation. Current Outpatient Medications   Medication Sig Dispense Refill    metFORMIN ER (GLUCOPHAGE XR) 500 mg tablet TAKE 2 TABLETS BY MOUTH TWICE A DAY WITH FOOD 360 Tab 2    atorvastatin (LIPITOR) 40 mg tablet TAKE 1 TABLET BY MOUTH EVERY DAY 90 Tab 1    amLODIPine (NORVASC) 5 mg tablet Take 1 tablet by mouth once daily 30 Tab 11    lisinopril-hydroCHLOROthiazide (PRINZIDE, ZESTORETIC) 20-12.5 mg per tablet Take 1 tablet by mouth once daily 90 Tab 11    metoprolol succinate (TOPROL-XL) 50 mg XL tablet Take 1 Tab by mouth nightly. 30 Tab 11    sildenafil citrate (VIAGRA) 100 mg tablet TAKE ONE TABLET BY MOUTH APPROXIMATELY ONE HOUR BEFORE SEXUAL ACTIVITY. DO NOT USE MORE THAN ONE DOSE DAILY 10 Tab 11    insulin NPH/insulin regular (NOVOLIN 70/30, HUMULIN 70/30) 100 unit/mL (70-30) injection 8 Units by SubCUTAneous route Daily (before breakfast). 10 mL 11    aspirin 81 mg chewable tablet Take 1 Tab by mouth daily. 100 Tab 11    amLODIPine (NORVASC) 10 mg tablet Take 0.5 Tabs by mouth daily. 27 Tab 11     Past Medical History:   Diagnosis Date    Anemia     DM (diabetes mellitus) ([address]     HTN (hypertension)     Preventative health care     Right knee gives way     Scrotal wall abscess     Stroke ([facility] 11/29/2017    l hemiparesis     History reviewed. No pertinent surgical history.   No Known Allergies      REVIEW OF SYSTEMS:  General: negative for - chills or fever  ENT: negative for - headaches, nasal congestion or tinnitus  Respiratory: negative for - cough, hemoptysis, shortness of breath or wheezing  Cardiovascular : negative for - chest pain, edema, palpitations or shortness of breath  Gastrointestinal: negative for - abdominal pain, blood in stools, heartburn or nausea/vomiting  Genito-Urinary: no dysuria, trouble voiding, or hematuria  Musculoskeletal: negative for - gait disturbance, joint pain, joint stiffness or joint swelling  Neurological: no TIA or stroke symptoms  Hematologic: no bruises, no bleeding, no swollen glands  Integument: no lumps, mole changes, nail changes or rash  Endocrine: no malaise/lethargy or unexpected weight changes      Social History     Socioeconomic History    Marital status:      Spouse name: Not on file    Number of children: Not on file    Years of education: Not on file    Highest education level: Not on file   Tobacco Use    Smoking status: Never Smoker    Smokeless tobacco: Never Used   Substance and Sexual Activity    Alcohol use: No     Comment: occassionally    Drug use: No    Sexual activity: Yes     Partners: Female     Birth control/protection: None   Social History Narrative    ** Merged History Encounter **     Habits: There is no history of cigarette abuse, occasional alcohol use. No drug abuse.           Social History:  Patient is  15 - [place]. He has no children and another person stays with him. 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 Mother was 76 and complications of diabetes. He has two brothers and one sister who are alive and well.      Family History   Problem Relation Age of Onset    Diabetes Mother     Heart Disease Father        OBJECTIVE:    Visit Vitals  /75   Pulse 73   Temp 97.9 °F (36.6 °C) (Oral)   Resp 18   Ht 5' 10\" (1.778 m)   Wt 252 lb 6.4 oz (114.5 kg)   SpO2 98%   BMI 36.22 kg/m²     CONSTITUTIONAL: well , well nourished, appears age appropriate  EYES: perrla, eom intact  ENMT:moist mucous membranes, pharynx clear  NECK: supple. Thyroid normal  RESPIRATORY: Chest: clear bilaterally   CARDIOVASCULAR: Heart: regular rate and rhythm  GASTROINTESTINAL: Abdomen: soft, bowel sounds active  HEMATOLOGIC: no pathological lymph nodes palpated  MUSCULOSKELETAL: Extremities: no edema, pulse 1+   INTEGUMENT: No unusual rashes or suspicious skin lesions noted. Nails appear normal.  NEUROLOGIC: non-focal exam   MENTAL STATUS: alert and oriented, appropriate affect           ASSESSMENT:  1. Type 2 diabetes with nephropathy ([address]    2. Severe obesity ([address]    3. Preventative health care    4. Essential hypertension    5. Anemia, unspecified type    6. Cerebrovascular accident (CVA) due to thrombosis of left middle cerebral artery ([address]      Blood sugar control will be assessed with hemoglobin A1c. With the improved appetite and weight gain, it will be interesting to see what it is. We mention obesity and he and his wife are going to begin an activity program with walking for 30 minutes five days a week. BP control is at goal.    I am following CBC. Last hemoglobin was 12, which is acceptable. I would like to be sure it is stable. History of stroke. No evidence of new neurologic deficits. He will be back to see me in three to four months, sooner if he needs to. I have discussed the diagnosis with the patient and the intended plan as seen in the  Orders. The patient understands and agees with the plan. The patient has   received an after visit summary and questions were answered concerning  future plans  Patient labs and/or xrays were reviewed  Past records were reviewed. PLAN:  . No orders of the defined types were placed in this encounter. Follow-up and Dispositions    · Return in about 3 months (around 2/5/2021).                 ATTENTION: This medical record was transcribed using an electronic medical records system. Although proofread, it may and can contain electronic and spelling errors. Other human spelling and other errors may be present. Corrections may be executed at a later time. Please feel free to contact us for any clarifications as needed.

## 2020-11-06 LAB
ALBUMIN SERPL-MCNC: 4.4 G/DL (ref 3.8–4.8)
BASOPHILS # BLD AUTO: 0 X10E3/UL (ref 0–0.2)
BASOPHILS NFR BLD AUTO: 1 %
BUN SERPL-MCNC: 17 MG/DL (ref 8–27)
BUN/CREAT SERPL: 19 (ref 10–24)
CALCIUM SERPL-MCNC: 10.1 MG/DL (ref 8.6–10.2)
CHLORIDE SERPL-SCNC: 105 MMOL/L (ref 96–106)
CO2 SERPL-SCNC: 23 MMOL/L (ref 20–29)
CREAT SERPL-MCNC: 0.89 MG/DL (ref 0.76–1.27)
EOSINOPHIL # BLD AUTO: 0.1 X10E3/UL (ref 0–0.4)
EOSINOPHIL NFR BLD AUTO: 2 %
ERYTHROCYTE [DISTWIDTH] IN BLOOD BY AUTOMATED COUNT: 16.7 % (ref 11.6–15.4)
EST. AVERAGE GLUCOSE BLD GHB EST-MCNC: 171 MG/DL
FERRITIN SERPL-MCNC: 153 NG/ML (ref 30–400)
GLUCOSE SERPL-MCNC: 139 MG/DL (ref 65–99)
HBA1C MFR BLD: 7.6 % (ref 4.8–5.6)
HCT VFR BLD AUTO: 40.2 % (ref 37.5–51)
HGB BLD-MCNC: 12.1 G/DL (ref 13–17.7)
IMM GRANULOCYTES # BLD AUTO: 0 X10E3/UL (ref 0–0.1)
IMM GRANULOCYTES NFR BLD AUTO: 0 %
IRON SATN MFR SERPL: 23 % (ref 15–55)
IRON SERPL-MCNC: 68 UG/DL (ref 38–169)
LYMPHOCYTES # BLD AUTO: 2.1 X10E3/UL (ref 0.7–3.1)
LYMPHOCYTES NFR BLD AUTO: 41 %
MCH RBC QN AUTO: 22 PG (ref 26.6–33)
MCHC RBC AUTO-ENTMCNC: 30.1 G/DL (ref 31.5–35.7)
MCV RBC AUTO: 73 FL (ref 79–97)
MONOCYTES # BLD AUTO: 0.6 X10E3/UL (ref 0.1–0.9)
MONOCYTES NFR BLD AUTO: 11 %
NEUTROPHILS # BLD AUTO: 2.3 X10E3/UL (ref 1.4–7)
NEUTROPHILS NFR BLD AUTO: 45 %
PHOSPHATE SERPL-MCNC: 3.4 MG/DL (ref 2.8–4.1)
PLATELET # BLD AUTO: 244 X10E3/UL (ref 150–450)
POTASSIUM SERPL-SCNC: 4.7 MMOL/L (ref 3.5–5.2)
RBC # BLD AUTO: 5.51 X10E6/UL (ref 4.14–5.8)
RETICS/RBC NFR AUTO: 1.4 % (ref 0.6–2.6)
SODIUM SERPL-SCNC: 142 MMOL/L (ref 134–144)
TIBC SERPL-MCNC: 299 UG/DL (ref 250–450)
UIBC SERPL-MCNC: 231 UG/DL (ref 111–343)
WBC # BLD AUTO: 5.1 X10E3/UL (ref 3.4–10.8)

## 2020-12-27 RX ORDER — SILDENAFIL 100 MG/1
TABLET, FILM COATED ORAL
Qty: 6 TAB | Refills: 11 | Status: SHIPPED | OUTPATIENT
Start: 2020-12-27 | End: 2022-03-11

## 2021-01-09 RX ORDER — METOPROLOL SUCCINATE 50 MG/1
TABLET, EXTENDED RELEASE ORAL
Qty: 90 TAB | Refills: 3 | Status: SHIPPED | OUTPATIENT
Start: 2021-01-09 | End: 2022-01-03

## 2021-01-17 RX ORDER — AMLODIPINE BESYLATE 5 MG/1
TABLET ORAL
Qty: 90 TAB | Refills: 3 | Status: SHIPPED | OUTPATIENT
Start: 2021-01-17 | End: 2022-01-03

## 2021-03-05 ENCOUNTER — OFFICE VISIT (OUTPATIENT)
Dept: INTERNAL MEDICINE CLINIC | Age: 61
End: 2021-03-05
Payer: COMMERCIAL

## 2021-03-05 VITALS
OXYGEN SATURATION: 96 % | BODY MASS INDEX: 36.45 KG/M2 | DIASTOLIC BLOOD PRESSURE: 78 MMHG | HEART RATE: 83 BPM | TEMPERATURE: 98.1 F | SYSTOLIC BLOOD PRESSURE: 134 MMHG | RESPIRATION RATE: 18 BRPM | HEIGHT: 70 IN | WEIGHT: 254.6 LBS

## 2021-03-05 DIAGNOSIS — E66.01 SEVERE OBESITY (HCC): ICD-10-CM

## 2021-03-05 DIAGNOSIS — D64.9 ANEMIA, UNSPECIFIED TYPE: ICD-10-CM

## 2021-03-05 DIAGNOSIS — I63.312 CEREBROVASCULAR ACCIDENT (CVA) DUE TO THROMBOSIS OF LEFT MIDDLE CEREBRAL ARTERY (HCC): ICD-10-CM

## 2021-03-05 DIAGNOSIS — E11.21 TYPE 2 DIABETES WITH NEPHROPATHY (HCC): Primary | ICD-10-CM

## 2021-03-05 DIAGNOSIS — I10 ESSENTIAL HYPERTENSION: ICD-10-CM

## 2021-03-05 PROCEDURE — 99214 OFFICE O/P EST MOD 30 MIN: CPT | Performed by: INTERNAL MEDICINE

## 2021-03-05 RX ORDER — VALSARTAN AND HYDROCHLOROTHIAZIDE 160; 12.5 MG/1; MG/1
1 TABLET, FILM COATED ORAL DAILY
Qty: 90 TAB | Refills: 3 | Status: SHIPPED | OUTPATIENT
Start: 2021-03-05 | End: 2022-02-23

## 2021-03-05 NOTE — PROGRESS NOTES
1. Have you been to the ER, urgent care clinic since your last visit? Hospitalized since your last visit? No    2. Have you seen or consulted any other health care providers outside of the [address] [place] [address] since your last visit? Include any pap smears or colon screening.  No

## 2021-03-06 LAB
ALBUMIN SERPL-MCNC: 4 G/DL (ref 3.5–5)
ANION GAP SERPL CALC-SCNC: 8 MMOL/L (ref 5–15)
BUN SERPL-MCNC: 18 MG/DL (ref 6–20)
BUN/CREAT SERPL: 17 (ref 12–20)
CALCIUM SERPL-MCNC: 9.3 MG/DL (ref 8.5–10.1)
CHLORIDE SERPL-SCNC: 106 MMOL/L (ref 97–108)
CO2 SERPL-SCNC: 27 MMOL/L (ref 21–32)
CREAT SERPL-MCNC: 1.07 MG/DL (ref 0.7–1.3)
EST. AVERAGE GLUCOSE BLD GHB EST-MCNC: 169 MG/DL
GLUCOSE SERPL-MCNC: 180 MG/DL (ref 65–100)
HBA1C MFR BLD: 7.5 % (ref 4–5.6)
PHOSPHATE SERPL-MCNC: 2.9 MG/DL (ref 2.6–4.7)
POTASSIUM SERPL-SCNC: 4.3 MMOL/L (ref 3.5–5.1)
SODIUM SERPL-SCNC: 141 MMOL/L (ref 136–145)

## 2021-06-19 RX ORDER — METFORMIN HYDROCHLORIDE 500 MG/1
TABLET, EXTENDED RELEASE ORAL
Qty: 360 TABLET | Refills: 2 | Status: SHIPPED | OUTPATIENT
Start: 2021-06-19 | End: 2022-03-14

## 2021-07-02 ENCOUNTER — OFFICE VISIT (OUTPATIENT)
Dept: INTERNAL MEDICINE CLINIC | Age: 61
End: 2021-07-02
Payer: COMMERCIAL

## 2021-07-02 VITALS
DIASTOLIC BLOOD PRESSURE: 76 MMHG | TEMPERATURE: 98 F | HEART RATE: 76 BPM | HEIGHT: 70 IN | SYSTOLIC BLOOD PRESSURE: 138 MMHG | WEIGHT: 251.7 LBS | OXYGEN SATURATION: 99 % | RESPIRATION RATE: 16 BRPM | BODY MASS INDEX: 36.03 KG/M2

## 2021-07-02 DIAGNOSIS — I10 ESSENTIAL HYPERTENSION: ICD-10-CM

## 2021-07-02 DIAGNOSIS — I63.312 CEREBROVASCULAR ACCIDENT (CVA) DUE TO THROMBOSIS OF LEFT MIDDLE CEREBRAL ARTERY (HCC): ICD-10-CM

## 2021-07-02 DIAGNOSIS — E66.01 SEVERE OBESITY (HCC): ICD-10-CM

## 2021-07-02 DIAGNOSIS — E11.21 TYPE 2 DIABETES WITH NEPHROPATHY (HCC): Primary | ICD-10-CM

## 2021-07-02 PROCEDURE — 3051F HG A1C>EQUAL 7.0%<8.0%: CPT | Performed by: INTERNAL MEDICINE

## 2021-07-02 PROCEDURE — 99214 OFFICE O/P EST MOD 30 MIN: CPT | Performed by: INTERNAL MEDICINE

## 2021-07-02 NOTE — PROGRESS NOTES
SPORTS MEDICINE AND PRIMARY CARE  Xioamra Trujillo MD, [address] [facility],[address] [address]  Phone:  [phone]  Fax: [phone]       Chief Complaint   Patient presents with    Diabetes     Patient is here for a follow up. .      SUBJECTIVE:    Darren Koenig is a 59 y.o. male Patient returns today with a known history of type 2 diabetes, obesity, primary hypertension, stroke, and is seen for evaluation. Patient voices no specific complaints. He states he \"feels wonderful\". Patient is seen for evaluation. Current Outpatient Medications   Medication Sig Dispense Refill    metFORMIN ER (GLUCOPHAGE XR) 500 mg tablet TAKE 2 TABLETS BY MOUTH TWICE A DAY WITH FOOD 360 Tablet 2    insulin NPH/insulin regular (NOVOLIN 70/30, HUMULIN 70/30) 100 unit/mL (70-30) injection 12 Units by SubCUTAneous route Daily (before breakfast). 10 mL 11    valsartan-hydroCHLOROthiazide (DIOVAN-HCT) 160-12.5 mg per tablet Take 1 Tab by mouth daily. 90 Tab 3    amLODIPine (NORVASC) 5 mg tablet TAKE 1 TABLET BY MOUTH EVERY DAY 90 Tab 3    metoprolol succinate (TOPROL-XL) 50 mg XL tablet TAKE 1 TABLET BY MOUTH NIGHTLY 90 Tab 3    sildenafil citrate (VIAGRA) 100 mg tablet TAKE 1 TABLET BY MOUTH APPROX 1 HOUR PRIOR TO SEXUAL ACTIVITY. MAX 1 PER DAY 6 Tab 11    atorvastatin (LIPITOR) 40 mg tablet TAKE 1 TABLET BY MOUTH EVERY DAY 90 Tab 3    aspirin 81 mg chewable tablet Take 1 Tab by mouth daily. 100 Tab 11     Past Medical History:   Diagnosis Date    Anemia     DM (diabetes mellitus) [facility]     HTN (hypertension)     Preventative health care     Right knee gives way     Scrotal wall abscess     Stroke ([facility] 11/29/2017    l hemiparesis     History reviewed. No pertinent surgical history.   No Known Allergies      REVIEW OF SYSTEMS:  General: negative for - chills or fever  ENT: negative for - headaches, nasal congestion or tinnitus  Respiratory: negative for - cough, hemoptysis, shortness of breath or wheezing  Cardiovascular : negative for - chest pain, edema, palpitations or shortness of breath  Gastrointestinal: negative for - abdominal pain, blood in stools, heartburn or nausea/vomiting  Genito-Urinary: no dysuria, trouble voiding, or hematuria  Musculoskeletal: negative for - gait disturbance, joint pain, joint stiffness or joint swelling  Neurological: no TIA or stroke symptoms  Hematologic: no bruises, no bleeding, no swollen glands  Integument: no lumps, mole changes, nail changes or rash  Endocrine: no malaise/lethargy or unexpected weight changes      Social History     Socioeconomic History    Marital status:      Spouse name: Not on file    Number of children: Not on file    Years of education: Not on file    Highest education level: Not on file   Tobacco Use    Smoking status: Never Smoker    Smokeless tobacco: Never Used   Vaping Use    Vaping Use: Never used   Substance and Sexual Activity    Alcohol use: No     Comment: occassionally    Drug use: No    Sexual activity: Yes     Partners: Female     Birth control/protection: None   Social History Narrative    ** Merged History Encounter **     Habits: There is no history of cigarette abuse, occasional alcohol use. No drug abuse.           Social History:  Patient is  15 - [place]. He has no children and another person stays with him. 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 Mother was 76 and complications of diabetes. He has two brothers and one sister who are alive and well. Social Determinants of Health     Financial Resource Strain:     Difficulty of Paying Living Expenses:    Food Insecurity:     Worried About Running Out of Food in the Last Year:     [address] in the Last Year:    Transportation Needs:     Lack of Transportation (Medical):      Lack of Transportation (Non-Medical):    Physical Activity:     Days of Exercise per Week:     Minutes of Exercise per Session:    Stress:     Feeling of Stress :    Social Connections:     Frequency of Communication with Friends and Family:     Frequency of Social Gatherings with Friends and Family:     Attends S[employer] Services:     Active Member of Clubs or Organizations:     Attends Club or Organization Meetings:     Marital Status:      Family History   Problem Relation Age of Onset    Diabetes Mother     Heart Disease Father        OBJECTIVE:    Visit Vitals  BP (!) 145/80   Pulse 76   Temp 98 °F (36.7 °C)   Resp 16   Ht 5' 10\" (1.778 m)   Wt 251 lb 11.2 oz (114.2 kg)   SpO2 99%   BMI 36.12 kg/m²     CONSTITUTIONAL: well , well nourished, appears age appropriate  EYES: perrla, eom intact  ENMT:moist mucous membranes, pharynx clear  NECK: supple. Thyroid normal  RESPIRATORY: Chest: clear bilaterally   CARDIOVASCULAR: Heart: regular rate and rhythm  GASTROINTESTINAL: Abdomen: soft, bowel sounds active  HEMATOLOGIC: no pathological lymph nodes palpated  MUSCULOSKELETAL: Extremities: no edema, pulse 1+   INTEGUMENT: No unusual rashes or suspicious skin lesions noted. Nails appear normal.  NEUROLOGIC: non-focal exam   MENTAL STATUS: alert and oriented, appropriate affect           ASSESSMENT:  1. Type 2 diabetes with nephropathy ([address]    2. Severe obesity ([address]    3. Essential hypertension    4. Cerebrovascular accident (CVA) due to thrombosis of left middle cerebral artery (HCC)      Blood sugar will be checked with hemoglobin A1c and we suggest he could ahead and increase his NPH 70/30 to 16 units before breakfast.  I would like to see the hemoglobin A1c less than 7. Obesity remains a challenge for him. He is not walking. We suggest to him he walk for 30 minutes five days a week. We will see what happens next time. Repeat blood pressure is 138/78, which is more acceptable. It runs in the 120s-130s, he states, at home.       No significant residual from the stroke. 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 He will be back to see us in four months, sooner if he has any problems. I have discussed the diagnosis with the patient and the intended plan as seen in the  Orders. The patient understands and agees with the plan. The patient has   received an after visit summary and questions were answered concerning  future plans  Patient labs and/or xrays were reviewed  Past records were reviewed. PLAN:  . No orders of the defined types were placed in this encounter. Follow-up and Dispositions    · Return in about 4 months (around 11/2/2021). ATTENTION:   This medical record was transcribed using an electronic medical records system. Although proofread, it may and can contain electronic and spelling errors. Other human spelling and other errors may be present. Corrections may be executed at a later time. Please feel free to contact us for any clarifications as needed.

## 2021-07-02 NOTE — PROGRESS NOTES
Chief Complaint   Patient presents with    Diabetes     Patient is here for a follow up. 1. Have you been to the ER, urgent care clinic since your last visit? Hospitalized since your last visit? No    2. Have you seen or consulted any other health care providers outside of the [address] [place] [address] since your last visit? Include any pap smears or colon screening.  No

## 2021-07-03 LAB
ALBUMIN SERPL-MCNC: 4.2 G/DL (ref 3.5–5)
ALBUMIN/GLOB SERPL: 1.5 {RATIO} (ref 1.1–2.2)
ALP SERPL-CCNC: 65 U/L (ref 45–117)
ALT SERPL-CCNC: 45 U/L (ref 12–78)
ANION GAP SERPL CALC-SCNC: 7 MMOL/L (ref 5–15)
APPEARANCE UR: CLEAR
AST SERPL-CCNC: 48 U/L (ref 15–37)
BACTERIA URNS QL MICRO: NEGATIVE /HPF
BASOPHILS # BLD: 0 K/UL (ref 0–0.1)
BASOPHILS NFR BLD: 1 % (ref 0–1)
BILIRUB SERPL-MCNC: 0.3 MG/DL (ref 0.2–1)
BILIRUB UR QL: NEGATIVE
BUN SERPL-MCNC: 18 MG/DL (ref 6–20)
BUN/CREAT SERPL: 17 (ref 12–20)
CALCIUM SERPL-MCNC: 9.5 MG/DL (ref 8.5–10.1)
CHLORIDE SERPL-SCNC: 107 MMOL/L (ref 97–108)
CHOLEST SERPL-MCNC: 124 MG/DL
CO2 SERPL-SCNC: 29 MMOL/L (ref 21–32)
COLOR UR: ABNORMAL
CREAT SERPL-MCNC: 1.07 MG/DL (ref 0.7–1.3)
DIFFERENTIAL METHOD BLD: ABNORMAL
EOSINOPHIL # BLD: 0.1 K/UL (ref 0–0.4)
EOSINOPHIL NFR BLD: 2 % (ref 0–7)
EPITH CASTS URNS QL MICRO: ABNORMAL /LPF
ERYTHROCYTE [DISTWIDTH] IN BLOOD BY AUTOMATED COUNT: 18.4 % (ref 11.5–14.5)
EST. AVERAGE GLUCOSE BLD GHB EST-MCNC: 163 MG/DL
GLOBULIN SER CALC-MCNC: 2.8 G/DL (ref 2–4)
GLUCOSE SERPL-MCNC: 196 MG/DL (ref 65–100)
GLUCOSE UR STRIP.AUTO-MCNC: >1000 MG/DL
HBA1C MFR BLD: 7.3 % (ref 4–5.6)
HCT VFR BLD AUTO: 40 % (ref 36.6–50.3)
HDLC SERPL-MCNC: 51 MG/DL
HDLC SERPL: 2.4 {RATIO} (ref 0–5)
HGB BLD-MCNC: 11.6 G/DL (ref 12.1–17)
HGB UR QL STRIP: NEGATIVE
HYALINE CASTS URNS QL MICRO: ABNORMAL /LPF (ref 0–5)
IMM GRANULOCYTES # BLD AUTO: 0 K/UL (ref 0–0.04)
IMM GRANULOCYTES NFR BLD AUTO: 0 % (ref 0–0.5)
KETONES UR QL STRIP.AUTO: ABNORMAL MG/DL
LDLC SERPL CALC-MCNC: 45.8 MG/DL (ref 0–100)
LEUKOCYTE ESTERASE UR QL STRIP.AUTO: NEGATIVE
LYMPHOCYTES # BLD: 1.2 K/UL (ref 0.8–3.5)
LYMPHOCYTES NFR BLD: 25 % (ref 12–49)
MCH RBC QN AUTO: 22.1 PG (ref 26–34)
MCHC RBC AUTO-ENTMCNC: 29 G/DL (ref 30–36.5)
MCV RBC AUTO: 76.3 FL (ref 80–99)
MONOCYTES # BLD: 0.6 K/UL (ref 0–1)
MONOCYTES NFR BLD: 12 % (ref 5–13)
NEUTS SEG # BLD: 2.9 K/UL (ref 1.8–8)
NEUTS SEG NFR BLD: 60 % (ref 32–75)
NITRITE UR QL STRIP.AUTO: NEGATIVE
NRBC # BLD: 0 K/UL (ref 0–0.01)
NRBC BLD-RTO: 0 PER 100 WBC
PH UR STRIP: 5 [PH] (ref 5–8)
PLATELET # BLD AUTO: 208 K/UL (ref 150–400)
PMV BLD AUTO: 11.8 FL (ref 8.9–12.9)
POTASSIUM SERPL-SCNC: 4.8 MMOL/L (ref 3.5–5.1)
PROT SERPL-MCNC: 7 G/DL (ref 6.4–8.2)
PROT UR STRIP-MCNC: 30 MG/DL
PSA SERPL-MCNC: 0.6 NG/ML (ref 0.01–4)
RBC # BLD AUTO: 5.24 M/UL (ref 4.1–5.7)
RBC #/AREA URNS HPF: ABNORMAL /HPF (ref 0–5)
SODIUM SERPL-SCNC: 143 MMOL/L (ref 136–145)
SP GR UR REFRACTOMETRY: 1.02 (ref 1–1.03)
TRIGL SERPL-MCNC: 136 MG/DL (ref ?–150)
UROBILINOGEN UR QL STRIP.AUTO: 0.2 EU/DL (ref 0.2–1)
VLDLC SERPL CALC-MCNC: 27.2 MG/DL
WBC # BLD AUTO: 4.9 K/UL (ref 4.1–11.1)
WBC URNS QL MICRO: ABNORMAL /HPF (ref 0–4)

## 2021-07-19 ENCOUNTER — TELEPHONE (OUTPATIENT)
Dept: INTERNAL MEDICINE CLINIC | Age: 61
End: 2021-07-19

## 2021-07-19 NOTE — TELEPHONE ENCOUNTER
Patient notified by phone and ask to rto for repeat hepatic profile and hepatitis abc. Patient also given number for appt for ultrasound of abdomen.

## 2021-07-20 ENCOUNTER — CLINICAL SUPPORT (OUTPATIENT)
Dept: INTERNAL MEDICINE CLINIC | Age: 61
End: 2021-07-20

## 2021-07-20 DIAGNOSIS — R79.89 ELEVATED LFTS: Primary | ICD-10-CM

## 2021-07-21 LAB
ALBUMIN SERPL-MCNC: 4.5 G/DL (ref 3.8–4.8)
ALP SERPL-CCNC: 63 IU/L (ref 48–121)
ALT SERPL-CCNC: 25 IU/L (ref 0–44)
AST SERPL-CCNC: 34 IU/L (ref 0–40)
BILIRUB DIRECT SERPL-MCNC: 0.09 MG/DL (ref 0–0.4)
BILIRUB SERPL-MCNC: 0.2 MG/DL (ref 0–1.2)
HAV IGM SERPL QL IA: NEGATIVE
HBV CORE IGM SERPL QL IA: NEGATIVE
HBV SURFACE AG SERPL QL IA: NEGATIVE
HCV AB S/CO SERPL IA: <0.1 S/CO RATIO (ref 0–0.9)
PROT SERPL-MCNC: 6.4 G/DL (ref 6–8.5)

## 2021-11-12 ENCOUNTER — OFFICE VISIT (OUTPATIENT)
Dept: INTERNAL MEDICINE CLINIC | Age: 61
End: 2021-11-12
Payer: COMMERCIAL

## 2021-11-12 VITALS
DIASTOLIC BLOOD PRESSURE: 75 MMHG | HEART RATE: 80 BPM | HEIGHT: 70 IN | WEIGHT: 253.3 LBS | RESPIRATION RATE: 18 BRPM | SYSTOLIC BLOOD PRESSURE: 150 MMHG | BODY MASS INDEX: 36.26 KG/M2 | TEMPERATURE: 98.4 F | OXYGEN SATURATION: 97 %

## 2021-11-12 DIAGNOSIS — I10 PRIMARY HYPERTENSION: ICD-10-CM

## 2021-11-12 DIAGNOSIS — E11.21 TYPE 2 DIABETES WITH NEPHROPATHY (HCC): Primary | ICD-10-CM

## 2021-11-12 DIAGNOSIS — D64.9 ANEMIA, UNSPECIFIED TYPE: ICD-10-CM

## 2021-11-12 DIAGNOSIS — I63.312 CEREBROVASCULAR ACCIDENT (CVA) DUE TO THROMBOSIS OF LEFT MIDDLE CEREBRAL ARTERY (HCC): ICD-10-CM

## 2021-11-12 DIAGNOSIS — E66.01 SEVERE OBESITY (HCC): ICD-10-CM

## 2021-11-12 DIAGNOSIS — Z23 NEEDS FLU SHOT: ICD-10-CM

## 2021-11-12 PROBLEM — Z98.890 S/P COLONOSCOPY: Status: ACTIVE | Noted: 2021-01-01

## 2021-11-12 LAB
COMMENT, HOLDF: NORMAL
SAMPLES BEING HELD,HOLD: NORMAL

## 2021-11-12 PROCEDURE — 90471 IMMUNIZATION ADMIN: CPT | Performed by: INTERNAL MEDICINE

## 2021-11-12 PROCEDURE — 90686 IIV4 VACC NO PRSV 0.5 ML IM: CPT | Performed by: INTERNAL MEDICINE

## 2021-11-12 PROCEDURE — 3051F HG A1C>EQUAL 7.0%<8.0%: CPT | Performed by: INTERNAL MEDICINE

## 2021-11-12 PROCEDURE — 99214 OFFICE O/P EST MOD 30 MIN: CPT | Performed by: INTERNAL MEDICINE

## 2021-11-12 NOTE — PROGRESS NOTES
1. Have you been to the ER, urgent care clinic since your last visit? Hospitalized since your last visit? No    2. Have you seen or consulted any other health care providers outside of the [address] [place] [address] since your last visit? Include any pap smears or colon screening.  No

## 2021-11-12 NOTE — PROGRESS NOTES
SPORTS MEDICINE AND PRIMARY CARE  Reagan Duval MD, [address] [address]  Phone:  [phone]  Fax: [phone]       Chief Complaint   Patient presents with    Diabetes   . SUBJECTIVE:    Keerthi Howard is a 59 y.o. male Patient returns today with a known history of anemia, hypertension, stroke with left hemiparesis, diabetes with nephropathy, severe obesity and is seen for evaluation. Patient returns today voicing no new complaints. He had a colonoscopy this summer at [facility], but unfortunately I do not have the results. He is going to look and see if he can find the dates. He does not need another one until ten years from now. So we will put a temporary date in to fill in the gaps until he gives us a formal date. Patient is seen for evaluation. Current Outpatient Medications   Medication Sig Dispense Refill    insulin NPH/insulin regular (NOVOLIN 70/30, HUMULIN 70/30) 100 unit/mL (70-30) injection 16 Units by SubCUTAneous route Daily (before breakfast). 15 mL 11    metFORMIN ER (GLUCOPHAGE XR) 500 mg tablet TAKE 2 TABLETS BY MOUTH TWICE A DAY WITH FOOD 360 Tablet 2    valsartan-hydroCHLOROthiazide (DIOVAN-HCT) 160-12.5 mg per tablet Take 1 Tab by mouth daily. 90 Tab 3    amLODIPine (NORVASC) 5 mg tablet TAKE 1 TABLET BY MOUTH EVERY DAY 90 Tab 3    metoprolol succinate (TOPROL-XL) 50 mg XL tablet TAKE 1 TABLET BY MOUTH NIGHTLY 90 Tab 3    sildenafil citrate (VIAGRA) 100 mg tablet TAKE 1 TABLET BY MOUTH APPROX 1 HOUR PRIOR TO SEXUAL ACTIVITY. MAX 1 PER DAY 6 Tab 11    atorvastatin (LIPITOR) 40 mg tablet TAKE 1 TABLET BY MOUTH EVERY DAY 90 Tab 3    aspirin 81 mg chewable tablet Take 1 Tab by mouth daily.  100 Tab 11     Past Medical History:   Diagnosis Date    Anemia     Anemia     DM (diabetes mellitus) ([address]     HTN (hypertension)     Preventative health care     Right knee gives way     S/P colonoscopy 2021    chil - repeat 10 yrs    Scrotal wall abscess     [facility]    l hemiparesis     Past Surgical History:   Procedure Laterality Date    HX PROSTATE SURGERY       No Known Allergies      REVIEW OF SYSTEMS:  General: negative for - chills or fever  ENT: negative for - headaches, nasal congestion or tinnitus  Respiratory: negative for - cough, hemoptysis, shortness of breath or wheezing  Cardiovascular : negative for - chest pain, edema, palpitations or shortness of breath  Gastrointestinal: negative for - abdominal pain, blood in stools, heartburn or nausea/vomiting  Genito-Urinary: no dysuria, trouble voiding, or hematuria  Musculoskeletal: negative for - gait disturbance, joint pain, joint stiffness or joint swelling  Neurological: no TIA or stroke symptoms  Hematologic: no bruises, no bleeding, no swollen glands  Integument: no lumps, mole changes, nail changes or rash  Endocrine: no malaise/lethargy or unexpected weight changes      Social History     Socioeconomic History    Marital status:    Tobacco Use    Smoking status: Never Smoker    Smokeless tobacco: Never Used   Vaping Use    Vaping Use: Never used   Substance and Sexual Activity    Alcohol use: No     Comment: occassionally    Drug use: No    Sexual activity: Yes     Partners: Female     Birth control/protection: None   Social History Narrative    ** Merged History Encounter **     Habits: There is no history of cigarette abuse, occasional alcohol use. No drug abuse.           Social History:  Patient is  15 - Vitaly Hernandez. He has no children and another person stays with him. 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 Mother was 76 and complications of diabetes. He has two brothers and one sister who are alive and well.      Family History   Problem Relation Age of Onset    Diabetes Mother     Heart Disease Father OBJECTIVE:    Visit Vitals  BP (!) 150/75   Pulse 80   Temp 98.4 °F (36.9 °C) (Oral)   Resp 18   Ht 5' 10\" (1.778 m)   Wt 253 lb 4.8 oz (114.9 kg)   SpO2 97%   BMI 36.34 kg/m²     CONSTITUTIONAL: well , well nourished, appears age appropriate  EYES: perrla, eom intact  ENMT:moist mucous membranes, pharynx clear  NECK: supple. Thyroid normal  RESPIRATORY: Chest: clear bilaterally   CARDIOVASCULAR: Heart: regular rate and rhythm  GASTROINTESTINAL: Abdomen: soft, bowel sounds active  HEMATOLOGIC: no pathological lymph nodes palpated  MUSCULOSKELETAL: Extremities: no edema, pulse 1+ Foot exam reveals mycotic nails. Sensation is intact to fine filament. Pulses are intact. INTEGUMENT: No unusual rashes or suspicious skin lesions noted. Nails appear normal.  NEUROLOGIC: non-focal exam   MENTAL STATUS: alert and oriented, appropriate affect           ASSESSMENT:  1. Type 2 diabetes with nephropathy ([facility]    2. Needs flu shot    3. Severe obesity ([facility]    4. Cerebrovascular accident (CVA) due to thrombosis of left middle cerebral artery ([facility]    5. Primary hypertension    6. Anemia, unspecified type      We review his blood sugar readings, most of which are fairly decent. I think we could crank up the insulin. He is taking 16 units and we could probably go to 18 units comfortably. But he is concerned about hypoglycemia, so we will wait and see what his hemoglobin A1c is. If it is greater than 7.5, then certainly he should go up by 2 units. If it is not, if it is around 7 or less, I think he can stay with the current dose. He got his flu shot today. Obesity remains a concern and we encourage a heart healthy, weight reducing diet and physical activity 30 minutes five days a week. No new neurologic deficits. Blood pressure fluctuates. He recorded his blood pressure readings for us and most of them are in the 140 range.   Some of them dropped down to 120s, so I am not willing to increase the antihypertensives at this point. The other concern I have is related to anemia. For the last two blood draws he was anemic, whereas before his hemoglobin was 13. He had a colonoscopy this summer, which excludes a colon source of the anemia. We will do a hematologic workup serologically and see if we find a source. We encourage him to get MyChart so he can communicate more with us with regards to his health needs. He will be back to see me in about four months, sooner if any problems. We encouraged him to get the shingles shot. I have discussed the diagnosis with the patient and the intended plan as seen in the  Orders. The patient understands and agees with the plan. The patient has   received an after visit summary and questions were answered concerning  future plans  Patient labs and/or xrays were reviewed  Past records were reviewed. PLAN:  .  Orders Placed This Encounter    Influenza Virus Vaccine QUAD, PF Syr 6 Months + (Flulaval, Fluzone, Fluarix 83426)    CBC WITH AUTOMATED DIFF    VITAMIN B12 & FOLATE    IRON PROFILE    RETICULOCYTE COUNT    GAMMOPATHY EVAL, SPEP/GEMA, IG QT/FLC    FERRITIN    HEMOGLOBIN A1C WITH EAG    MICROALBUMIN, UR, RAND W/ MICROALB/CREAT RATIO    REFERRAL TO PODIATRY       Follow-up and Dispositions    · Return in about 4 months (around 3/12/2022). ATTENTION:   This medical record was transcribed using an electronic medical records system. Although proofread, it may and can contain electronic and spelling errors. Other human spelling and other errors may be present. Corrections may be executed at a later time. Please feel free to contact us for any clarifications as needed.

## 2021-11-12 NOTE — PATIENT INSTRUCTIONS
Vaccine Information Statement    Influenza (Flu) Vaccine (Inactivated or Recombinant): What You Need to Know    Many vaccine information statements are available in Kiswahili and other languages. See www.immunize.org/vis. Hojas de información sobre vacunas están disponibles en español y en muchos otros idiomas. Visite www.immunize.org/vis. 1. Why get vaccinated? Influenza vaccine can prevent influenza (flu). Flu is a contagious disease that spreads around the [facility] every year, usually between October and May. Anyone can get the flu, but it is more dangerous for some people. Infants and young children, people 72 years and older, pregnant people, and people with certain health conditions or a weakened immune system are at greatest risk of flu complications. Pneumonia, bronchitis, sinus infections, and ear infections are examples of flu-related complications. If you have a medical condition, such as heart disease, cancer, or diabetes, flu can make it worse. Flu can cause fever and chills, sore throat, muscle aches, fatigue, cough, headache, and runny or stuffy nose. Some people may have vomiting and diarrhea, though this is more common in children than adults. In an average year, thousands of people in the [facility] die from flu, and many more are hospitalized. Flu vaccine prevents millions of illnesses and flu-related visits to the doctor each year. 2. Influenza vaccines     CDC recommends everyone 6 months and older get vaccinated every flu season. Children 6 months through 6years of age may need 2 doses during a single flu season. Everyone else needs only 1 dose each flu season. It takes about 2 weeks for protection to develop after vaccination. There are many flu viruses, and they are always changing. Each year a new flu vaccine is made to protect against the influenza viruses believed to be likely to cause disease in the upcoming flu season.  Even when the vaccine doesnt exactly match these viruses, it may still provide some protection. Influenza vaccine does not cause flu. Influenza vaccine may be given at the same time as other vaccines. 3. Talk with your health care provider    Tell your vaccination provider if the person getting the vaccine:   Has had an allergic reaction after a previous dose of influenza vaccine, or has any severe, life-threatening allergies    Has ever had Guillain-Barré Syndrome (also called GBS)    In some cases, your health care provider may decide to postpone influenza vaccination until a future visit. Influenza vaccine can be administered at any time during pregnancy. People who are or will be pregnant during influenza season should receive inactivated influenza vaccine. People with minor illnesses, such as a cold, may be vaccinated. People who are moderately or severely ill should usually wait until they recover before getting influenza vaccine. Your health care provider can give you more information. 4. Risks of a vaccine reaction     Soreness, redness, and swelling where the shot is given, fever, muscle aches, and headache can happen after influenza vaccination.  There may be a very small increased risk of Guillain-Barré Syndrome (GBS) after inactivated influenza vaccine (the flu shot). Venida Shellie children who get the flu shot along with pneumococcal vaccine (PCV13) and/or DTaP vaccine at the same time might be slightly more likely to have a seizure caused by fever. Tell your health care provider if a child who is getting flu vaccine has ever had a seizure. People sometimes faint after medical procedures, including vaccination. Tell your provider if you feel dizzy or have vision changes or ringing in the ears. As with any medicine, there is a very remote chance of a vaccine causing a severe allergic reaction, other serious injury, or death. 5. What if there is a serious problem?     An allergic reaction could occur after the vaccinated person leaves the clinic. If you see signs of a severe allergic reaction (hives, swelling of the face and throat, difficulty breathing, a fast heartbeat, dizziness, or weakness), call 9-1-1 and get the person to the nearest hospital.    For other signs that concern you, call your health care provider. Adverse reactions should be reported to the Vaccine Adverse Event Reporting System (VAERS). Your health care provider will usually file this report, or you can do it yourself. Visit the VAERS website at www.vaers. [facility].gov or call 8-749.805.4455. VAERS is only for reporting reactions, and VAERS staff members do not give medical advice. 6. The National Vaccine Injury Compensation Program    The [facility] Vaccine Injury Compensation Program (VICP) is a federal program that was created to compensate people who may have been injured by certain vaccines. Claims regarding alleged injury or death due to vaccination have a time limit for filing, which may be as short as two years. Visit the VICP website at www.[facility].gov/vaccinecompensation or call 1-291.263.2342 to learn about the program and about filing a claim. 7. How can I learn more?  Ask your health care provider.  Call your local or state health department.  Visit the website of the Food and Drug Administration (FDA) for vaccine package inserts and additional information at www.fda.gov/vaccines-blood-biologics/vaccines.  Contact the Centers for Disease Control and Prevention (CDC):  - Call 5-136.144.9143 (1-800-CDC-INFO) or  - Visit CDCs influenza website at www.cdc.gov/flu. Vaccine Information Statement   Inactivated Influenza Vaccine   8/6/2021  42 EVELIN Serna [id number]-[id number]   Department of Health and Human Services  Centers for Disease Control and Prevention    Office Use Only

## 2021-11-13 LAB
BASOPHILS # BLD: 0 K/UL (ref 0–0.1)
BASOPHILS NFR BLD: 1 % (ref 0–1)
CREAT UR-MCNC: 141 MG/DL
DIFFERENTIAL METHOD BLD: ABNORMAL
EOSINOPHIL # BLD: 0.1 K/UL (ref 0–0.4)
EOSINOPHIL NFR BLD: 2 % (ref 0–7)
ERYTHROCYTE [DISTWIDTH] IN BLOOD BY AUTOMATED COUNT: 18.1 % (ref 11.5–14.5)
EST. AVERAGE GLUCOSE BLD GHB EST-MCNC: 166 MG/DL
FERRITIN SERPL-MCNC: 72 NG/ML (ref 26–388)
FOLATE SERPL-MCNC: 30.8 NG/ML (ref 5–21)
HBA1C MFR BLD: 7.4 % (ref 4–5.6)
HCT VFR BLD AUTO: 42.9 % (ref 36.6–50.3)
HGB BLD-MCNC: 12.6 G/DL (ref 12.1–17)
IMM GRANULOCYTES # BLD AUTO: 0 K/UL (ref 0–0.04)
IMM GRANULOCYTES NFR BLD AUTO: 0 % (ref 0–0.5)
IRON SATN MFR SERPL: 19 % (ref 20–50)
IRON SERPL-MCNC: 62 UG/DL (ref 35–150)
LYMPHOCYTES # BLD: 2.1 K/UL (ref 0.8–3.5)
LYMPHOCYTES NFR BLD: 39 % (ref 12–49)
MCH RBC QN AUTO: 21.9 PG (ref 26–34)
MCHC RBC AUTO-ENTMCNC: 29.4 G/DL (ref 30–36.5)
MCV RBC AUTO: 74.5 FL (ref 80–99)
MICROALBUMIN UR-MCNC: 38.6 MG/DL
MICROALBUMIN/CREAT UR-RTO: 274 MG/G (ref 0–30)
MONOCYTES # BLD: 0.4 K/UL (ref 0–1)
MONOCYTES NFR BLD: 8 % (ref 5–13)
NEUTS SEG # BLD: 2.7 K/UL (ref 1.8–8)
NEUTS SEG NFR BLD: 51 % (ref 32–75)
NRBC # BLD: 0 K/UL (ref 0–0.01)
NRBC BLD-RTO: 0 PER 100 WBC
PLATELET # BLD AUTO: 222 K/UL (ref 150–400)
RBC # BLD AUTO: 5.76 M/UL (ref 4.1–5.7)
RETICS # AUTO: 0.08 M/UL (ref 0.03–0.1)
RETICS/RBC NFR AUTO: 1.3 % (ref 0.7–2.1)
TIBC SERPL-MCNC: 322 UG/DL (ref 250–450)
VIT B12 SERPL-MCNC: 417 PG/ML (ref 193–986)
WBC # BLD AUTO: 5.3 K/UL (ref 4.1–11.1)

## 2021-11-16 LAB
ALBUMIN SERPL ELPH-MCNC: 4 G/DL (ref 2.9–4.4)
ALBUMIN/GLOB SERPL: 1.4 {RATIO} (ref 0.7–1.7)
ALPHA1 GLOB SERPL ELPH-MCNC: 0.2 G/DL (ref 0–0.4)
ALPHA2 GLOB SERPL ELPH-MCNC: 0.8 G/DL (ref 0.4–1)
B-GLOBULIN SERPL ELPH-MCNC: 1 G/DL (ref 0.7–1.3)
GAMMA GLOB SERPL ELPH-MCNC: 0.9 G/DL (ref 0.4–1.8)
GLOBULIN SER-MCNC: 3 G/DL (ref 2.2–3.9)
IGA SERPL-MCNC: 153 MG/DL (ref 61–437)
IGG SERPL-MCNC: 920 MG/DL (ref 603–1613)
IGM SERPL-MCNC: 28 MG/DL (ref 20–172)
INTERPRETATION SERPL IEP-IMP: ABNORMAL
KAPPA LC FREE SER-MCNC: 22.6 MG/L (ref 3.3–19.4)
KAPPA LC FREE/LAMBDA FREE SER: 1.21 {RATIO} (ref 0.26–1.65)
LAMBDA LC FREE SERPL-MCNC: 18.7 MG/L (ref 5.7–26.3)
M PROTEIN SERPL ELPH-MCNC: ABNORMAL G/DL
PROT SERPL-MCNC: 7 G/DL (ref 6–8.5)

## 2021-12-11 RX ORDER — ATORVASTATIN CALCIUM 40 MG/1
TABLET, FILM COATED ORAL
Qty: 90 TABLET | Refills: 3 | Status: SHIPPED | OUTPATIENT
Start: 2021-12-11

## 2022-01-03 RX ORDER — AMLODIPINE BESYLATE 5 MG/1
TABLET ORAL
Qty: 90 TABLET | Refills: 3 | Status: SHIPPED | OUTPATIENT
Start: 2022-01-03

## 2022-01-03 RX ORDER — METOPROLOL SUCCINATE 50 MG/1
TABLET, EXTENDED RELEASE ORAL
Qty: 90 TABLET | Refills: 3 | Status: SHIPPED | OUTPATIENT
Start: 2022-01-03

## 2022-02-23 RX ORDER — VALSARTAN AND HYDROCHLOROTHIAZIDE 160; 12.5 MG/1; MG/1
TABLET, FILM COATED ORAL
Qty: 90 TABLET | Refills: 3 | Status: SHIPPED | OUTPATIENT
Start: 2022-02-23

## 2022-03-11 ENCOUNTER — OFFICE VISIT (OUTPATIENT)
Dept: INTERNAL MEDICINE CLINIC | Age: 62
End: 2022-03-11
Payer: COMMERCIAL

## 2022-03-11 VITALS
RESPIRATION RATE: 20 BRPM | SYSTOLIC BLOOD PRESSURE: 158 MMHG | DIASTOLIC BLOOD PRESSURE: 78 MMHG | TEMPERATURE: 98 F | HEART RATE: 81 BPM | WEIGHT: 245 LBS | OXYGEN SATURATION: 95 % | HEIGHT: 70 IN | BODY MASS INDEX: 35.07 KG/M2

## 2022-03-11 DIAGNOSIS — E66.01 SEVERE OBESITY (HCC): ICD-10-CM

## 2022-03-11 DIAGNOSIS — I10 PRIMARY HYPERTENSION: ICD-10-CM

## 2022-03-11 DIAGNOSIS — I63.312 CEREBROVASCULAR ACCIDENT (CVA) DUE TO THROMBOSIS OF LEFT MIDDLE CEREBRAL ARTERY (HCC): ICD-10-CM

## 2022-03-11 DIAGNOSIS — E11.21 TYPE 2 DIABETES WITH NEPHROPATHY (HCC): Primary | ICD-10-CM

## 2022-03-11 PROCEDURE — 99214 OFFICE O/P EST MOD 30 MIN: CPT | Performed by: INTERNAL MEDICINE

## 2022-03-11 RX ORDER — SILDENAFIL 100 MG/1
TABLET, FILM COATED ORAL
Qty: 6 TABLET | Refills: 11 | Status: SHIPPED | OUTPATIENT
Start: 2022-03-11

## 2022-03-11 NOTE — PROGRESS NOTES
SPORTS MEDICINE AND PRIMARY CARE  Krzysztof Shea MD, [address] [place] [address],[address] [address]  Phone:  [phone]  Fax: [phone]       Chief Complaint   Patient presents with    Hypertension   . SUBJECTIVE:    Jethro Dexter is a 57 y.o. male Patient returns today with a known history of primary hypertension, CVA, type 2 diabetes, obesity, and is seen for evaluation. No new complaints. 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 He takes his insulin in the morning and takes Metformin twice a day. patient denies specific complaints and is seen for evaluation. Current Outpatient Medications   Medication Sig Dispense Refill    valsartan-hydroCHLOROthiazide (DIOVAN-HCT) 160-12.5 mg per tablet TAKE 1 TABLET BY MOUTH EVERY DAY 90 Tablet 3    metoprolol succinate (TOPROL-XL) 50 mg XL tablet TAKE 1 TABLET BY MOUTH EVERY DAY AT NIGHT 90 Tablet 3    amLODIPine (NORVASC) 5 mg tablet TAKE 1 TABLET BY MOUTH EVERY DAY 90 Tablet 3    atorvastatin (LIPITOR) 40 mg tablet TAKE 1 TABLET BY MOUTH EVERY DAY 90 Tablet 3    insulin NPH/insulin regular (NOVOLIN 70/30, HUMULIN 70/30) 100 unit/mL (70-30) injection 16 Units by SubCUTAneous route Daily (before breakfast). 15 mL 11    metFORMIN ER (GLUCOPHAGE XR) 500 mg tablet TAKE 2 TABLETS BY MOUTH TWICE A DAY WITH FOOD 360 Tablet 2    sildenafil citrate (VIAGRA) 100 mg tablet TAKE 1 TABLET BY MOUTH APPROX 1 HOUR PRIOR TO SEXUAL ACTIVITY. MAX 1 PER DAY 6 Tab 11    aspirin 81 mg chewable tablet Take 1 Tab by mouth daily.  100 Tab 11     Past Medical History:   Diagnosis Date    Anemia     Anemia     DM (diabetes mellitus) ([address]     HTN (hypertension)     Onychomycosis     Preventative health care     Right knee gives way     S/P colonoscopy 2021    chil - repeat 10 yrs    Scrotal wall abscess     Stroke ([address] 11/29/2017    l hemiparesis     Past Surgical History:   Procedure Laterality Date    HX PROSTATE SURGERY       No Known Allergies      REVIEW OF SYSTEMS:  General: negative for - chills or fever  ENT: negative for - headaches, nasal congestion or tinnitus  Respiratory: negative for - cough, hemoptysis, shortness of breath or wheezing  Cardiovascular : negative for - chest pain, edema, palpitations or shortness of breath  Gastrointestinal: negative for - abdominal pain, blood in stools, heartburn or nausea/vomiting  Genito-Urinary: no dysuria, trouble voiding, or hematuria  Musculoskeletal: negative for - gait disturbance, joint pain, joint stiffness or joint swelling  Neurological: no TIA or stroke symptoms  Hematologic: no bruises, no bleeding, no swollen glands  Integument: no lumps, mole changes, nail changes or rash  Endocrine: no malaise/lethargy or unexpected weight changes      Social History     Socioeconomic History    Marital status:    Tobacco Use    Smoking status: Never Smoker    Smokeless tobacco: Never Used   Vaping Use    Vaping Use: Never used   Substance and Sexual Activity    Alcohol use: No     Comment: occassionally    Drug use: No    Sexual activity: Yes     Partners: Female     Birth control/protection: None   Social History Narrative    ** Merged History Encounter **     Habits: There is no history of cigarette abuse, occasional alcohol use. No drug abuse.           Social History:  Patient is  15 - Vian[address] Hay. He has no children and another person stays with him. 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 Mother was 76 and complications of diabetes. He has two brothers and one sister who are alive and well.      Family History   Problem Relation Age of Onset    Diabetes Mother     Heart Disease Father        OBJECTIVE:    Visit Vitals  BP (!) 158/78   Pulse 81   Temp 98 °F (36.7 °C) (Oral)   Resp 20   Ht 5' 10\" (1.778 m)   Wt 245 lb (111.1 kg)   SpO2 95%   BMI 35.15 kg/m²     CONSTITUTIONAL: well , well nourished, appears age appropriate  EYES: perrla, eom intact  ENMT:moist mucous membranes, pharynx clear  NECK: supple. Thyroid normal  RESPIRATORY: Chest: clear bilaterally   CARDIOVASCULAR: Heart: regular rate and rhythm  GASTROINTESTINAL: Abdomen: soft, bowel sounds active  HEMATOLOGIC: no pathological lymph nodes palpated  MUSCULOSKELETAL: Extremities: no edema, pulse 1+   INTEGUMENT: No unusual rashes or suspicious skin lesions noted. Nails appear normal.  NEUROLOGIC: non-focal exam   MENTAL STATUS: alert and oriented, appropriate affect           ASSESSMENT:  1. Primary hypertension    2. Cerebrovascular accident (CVA) due to thrombosis of left middle cerebral artery ([address]    3. Type 2 diabetes with nephropathy ([address]    4. Severe obesity ([address]      Blood pressure control is lacking when we check it today. When we look at his numbers, his blood pressure was a low as 111/76 and up to 142/76 as the high during the month of March, but before that his blood pressures fluctuated, but usually fairly acceptable. He occasionally has a blood pressure in the 150s, but more recently it has been more acceptable. I am reluctant to increase the antihypertensives because of the low blood pressure of 122. I am concerned that if I increase it, he will drop his blood pressure too low and therefore we will leave it at his current dose. My desire is for it to be in the 120s the majority of the time because of his history of thrombosis of the left middle cerebral artery and I do not want that to reoccur. But then today's blood pressure was 164/80 when I checked it. Known history of DM type 2. I will add a small dose of insulin before supper. He is using 70/30. We suggest he try and take it at least an hour before supper or whenever he can before supper, and will start with 4 units. I would like to see his fastings a little lower.   I suspect by pushing his blood sugar down, fasting, we should get his hemoglobin A1c less than 7, which is where I want it to be, without hypoglycemia. He has severe obesity, but we are amazed as he has lost 8 pounds since we last saw him and we congratulate him. It is a major accomplishment. His care gaps include pneumonia vaccine, which we recommend, shingles vaccine, which we recommend, and he has to get his booster yet. He will be back to see me in three to four months, sooner if needed. I have discussed the diagnosis with the patient and the intended plan as seen in the  Orders. The patient understands and agees with the plan. The patient has   received an after visit summary and questions were answered concerning  future plans  Patient labs and/or xrays were reviewed  Past records were reviewed. PLAN:  . No orders of the defined types were placed in this encounter. ATTENTION:   This medical record was transcribed using an electronic medical records system. Although proofread, it may and can contain electronic and spelling errors. Other human spelling and other errors may be present. Corrections may be executed at a later time. Please feel free to contact us for any clarifications as needed.

## 2022-03-11 NOTE — PROGRESS NOTES
1. Have you been to the ER, urgent care clinic since your last visit? Hospitalized since your last visit? No    2. Have you seen or consulted any other health care providers outside of the [address] [place] [address] since your last visit? Include any pap smears or colon screening.  No

## 2022-03-12 LAB
EST. AVERAGE GLUCOSE BLD GHB EST-MCNC: 186 MG/DL
HBA1C MFR BLD: 8.1 % (ref 4.8–5.6)

## 2022-03-14 RX ORDER — METFORMIN HYDROCHLORIDE 500 MG/1
TABLET, EXTENDED RELEASE ORAL
Qty: 360 TABLET | Refills: 2 | Status: SHIPPED | OUTPATIENT
Start: 2022-03-14

## 2022-03-18 PROBLEM — I63.9 STROKE (HCC): Status: ACTIVE | Noted: 2017-11-29

## 2022-03-19 PROBLEM — Z98.890 S/P COLONOSCOPY: Status: ACTIVE | Noted: 2021-01-01

## 2022-03-19 PROBLEM — E66.01 SEVERE OBESITY (HCC): Status: ACTIVE | Noted: 2019-06-20

## 2022-03-19 PROBLEM — E11.21 TYPE 2 DIABETES WITH NEPHROPATHY (HCC): Status: ACTIVE | Noted: 2018-08-16

## 2022-06-24 ENCOUNTER — OFFICE VISIT (OUTPATIENT)
Dept: INTERNAL MEDICINE CLINIC | Age: 62
End: 2022-06-24
Payer: COMMERCIAL

## 2022-06-24 VITALS
HEART RATE: 78 BPM | WEIGHT: 249.3 LBS | RESPIRATION RATE: 18 BRPM | BODY MASS INDEX: 35.69 KG/M2 | HEIGHT: 70 IN | OXYGEN SATURATION: 98 % | SYSTOLIC BLOOD PRESSURE: 162 MMHG | DIASTOLIC BLOOD PRESSURE: 76 MMHG | TEMPERATURE: 98.3 F

## 2022-06-24 DIAGNOSIS — E11.21 TYPE 2 DIABETES WITH NEPHROPATHY (HCC): Primary | ICD-10-CM

## 2022-06-24 DIAGNOSIS — I10 PRIMARY HYPERTENSION: ICD-10-CM

## 2022-06-24 DIAGNOSIS — I63.312 CEREBROVASCULAR ACCIDENT (CVA) DUE TO THROMBOSIS OF LEFT MIDDLE CEREBRAL ARTERY (HCC): ICD-10-CM

## 2022-06-24 DIAGNOSIS — E66.01 SEVERE OBESITY (HCC): ICD-10-CM

## 2022-06-24 PROCEDURE — 3052F HG A1C>EQUAL 8.0%<EQUAL 9.0%: CPT | Performed by: INTERNAL MEDICINE

## 2022-06-24 PROCEDURE — 99214 OFFICE O/P EST MOD 30 MIN: CPT | Performed by: INTERNAL MEDICINE

## 2022-06-24 NOTE — PROGRESS NOTES
Teresa Enciso is a 57 y.o. male    Chief Complaint   Patient presents with    Diabetes    Hypertension     1. Have you been to the ER, urgent care clinic since your last visit? Hospitalized since your last visit? No     2. Have you seen or consulted any other health care providers outside of the [address] [place] [address] since your last visit? Include any pap smears or colon screening.  No

## 2022-06-24 NOTE — PROGRESS NOTES
SPORTS MEDICINE AND PRIMARY CARE  Ken Garza MD, [address] [facility],[address] [address]  Phone:  [phone]  Fax: [phone]       Chief Complaint   Patient presents with    Diabetes    Hypertension   . SUBJECTIVE:    Jian Garcia is a 57 y.o. male Patient returns today with a known history of diabetes mellitus type 2, obesity, hypertension, CVA, and is seen for evaluation. He plans to get his Moderna shot today, he plans to get his shingles shot, which are recorded. Current Outpatient Medications   Medication Sig Dispense Refill    metFORMIN ER (GLUCOPHAGE XR) 500 mg tablet TAKE 2 TABLETS BY MOUTH TWICE A DAY WITH FOOD 360 Tablet 2    sildenafil citrate (VIAGRA) 100 mg tablet TAKE 1 TABLET BY MOUTH APPROX 1 HOUR PRIOR TO SEXUAL ACTIVITY. MAX 1 PER DAY 6 Tablet 11    valsartan-hydroCHLOROthiazide (DIOVAN-HCT) 160-12.5 mg per tablet TAKE 1 TABLET BY MOUTH EVERY DAY 90 Tablet 3    metoprolol succinate (TOPROL-XL) 50 mg XL tablet TAKE 1 TABLET BY MOUTH EVERY DAY AT NIGHT 90 Tablet 3    amLODIPine (NORVASC) 5 mg tablet TAKE 1 TABLET BY MOUTH EVERY DAY 90 Tablet 3    atorvastatin (LIPITOR) 40 mg tablet TAKE 1 TABLET BY MOUTH EVERY DAY 90 Tablet 3    insulin NPH/insulin regular (NOVOLIN 70/30, HUMULIN 70/30) 100 unit/mL (70-30) injection 16 Units by SubCUTAneous route Daily (before breakfast). 15 mL 11    aspirin 81 mg chewable tablet Take 1 Tab by mouth daily.  100 Tab 11     Past Medical History:   Diagnosis Date    Anemia     Anemia     DM (diabetes mellitus) ([address]     HTN (hypertension)     Onychomycosis     Preventative health care     Right knee gives way     S/P colonoscopy 2021    chil - repeat 10 yrs    Scrotal wall abscess     Stroke ([address] 11/29/2017    l hemiparesis     Past Surgical History:   Procedure Laterality Date    HX PROSTATE SURGERY       No Known Allergies      REVIEW OF SYSTEMS:  General: negative for - chills or fever  ENT: negative for - headaches, nasal congestion or tinnitus  Respiratory: negative for - cough, hemoptysis, shortness of breath or wheezing  Cardiovascular : negative for - chest pain, edema, palpitations or shortness of breath  Gastrointestinal: negative for - abdominal pain, blood in stools, heartburn or nausea/vomiting  Genito-Urinary: no dysuria, trouble voiding, or hematuria  Musculoskeletal: negative for - gait disturbance, joint pain, joint stiffness or joint swelling  Neurological: no TIA or stroke symptoms  Hematologic: no bruises, no bleeding, no swollen glands  Integument: no lumps, mole changes, nail changes or rash  Endocrine: no malaise/lethargy or unexpected weight changes      Social History     Socioeconomic History    Marital status:    Tobacco Use    Smoking status: Never Smoker    Smokeless tobacco: Never Used   Vaping Use    Vaping Use: Never used   Substance and Sexual Activity    Alcohol use: No     Comment: occassionally    Drug use: No    Sexual activity: Yes     Partners: Female     Birth control/protection: None   Social History Narrative    ** Merged History Encounter **     Habits: There is no history of cigarette abuse, occasional alcohol use. No drug abuse.           Social History:  Patient is  15 - Tor Nest. He has no children and another person stays with him. 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 Mother was 76 and complications of diabetes. He has two brothers and one sister who are alive and well.      Family History   Problem Relation Age of Onset    Diabetes Mother     Heart Disease Father        OBJECTIVE:    Visit Vitals  BP (!) 162/76 (BP 1 Location: Right arm, BP Patient Position: Sitting)   Pulse 78   Temp 98.3 °F (36.8 °C) (Oral)   Resp 18   Ht 5' 10\" (1.778 m)   Wt 249 lb 4.8 oz (113.1 kg)   SpO2 98%   BMI 35.77 kg/m²     CONSTITUTIONAL: well , well nourished, appears age appropriate  EYES: perrla, eom intact  ENMT:moist mucous membranes, pharynx clear  NECK: supple. Thyroid normal  RESPIRATORY: Chest: clear bilaterally   CARDIOVASCULAR: Heart: regular rate and rhythm  GASTROINTESTINAL: Abdomen: soft, bowel sounds active  HEMATOLOGIC: no pathological lymph nodes palpated  MUSCULOSKELETAL: Extremities: no edema, pulse 1+   INTEGUMENT: No unusual rashes or suspicious skin lesions noted. Nails appear normal.  NEUROLOGIC: non-focal exam   MENTAL STATUS: alert and oriented, appropriate affect           ASSESSMENT:  1. Type 2 diabetes with nephropathy ([address]    2. Severe obesity ([facility]    3. Primary hypertension    4. Cerebrovascular accident (CVA) due to thrombosis of left middle cerebral artery (HCC)      We will assess blood sugar control with a hemoglobin A1c and report to him the results with a note. We [facility] Obesity remains a concern and he decided to gain another 4 pounds since we last saw him. We certainly would like him to go to the opposite way with a heart healthy, diabetic, weight reducing diet and physical activity 30 minutes five days a week. Repeat blood pressure is 136/84, blood pressure is usually in the 130s/80s at home. History of CVA. It is important that we keep his blood pressure down and it is even more important that he increases his physical activity on a regular basis to keep him from having the predicted second stroke. He will be back to see me in three to four months, sooner if needed. I have discussed the diagnosis with the patient and the intended plan as seen in the  Orders. The patient understands and agees with the plan. The patient has   received an after visit summary and questions were answered concerning  future plans  Patient labs and/or xrays were reviewed  Past records were reviewed. PLAN:  . No orders of the defined types were placed in this encounter.       Follow-up and Dispositions    · Return in about 4 months (around 10/24/2022). ATTENTION:   This medical record was transcribed using an electronic medical records system. Although proofread, it may and can contain electronic and spelling errors. Other human spelling and other errors may be present. Corrections may be executed at a later time. Please feel free to contact us for any clarifications as needed.

## 2022-10-28 ENCOUNTER — OFFICE VISIT (OUTPATIENT)
Dept: INTERNAL MEDICINE CLINIC | Age: 62
End: 2022-10-28
Payer: COMMERCIAL

## 2022-10-28 VITALS
RESPIRATION RATE: 18 BRPM | HEIGHT: 70 IN | WEIGHT: 247.1 LBS | OXYGEN SATURATION: 99 % | DIASTOLIC BLOOD PRESSURE: 77 MMHG | HEART RATE: 77 BPM | TEMPERATURE: 98.2 F | BODY MASS INDEX: 35.37 KG/M2 | SYSTOLIC BLOOD PRESSURE: 139 MMHG

## 2022-10-28 DIAGNOSIS — E66.01 SEVERE OBESITY (HCC): ICD-10-CM

## 2022-10-28 DIAGNOSIS — B35.1 ONYCHOMYCOSIS: ICD-10-CM

## 2022-10-28 DIAGNOSIS — D64.9 ANEMIA, UNSPECIFIED TYPE: ICD-10-CM

## 2022-10-28 DIAGNOSIS — E11.21 TYPE 2 DIABETES WITH NEPHROPATHY (HCC): Primary | ICD-10-CM

## 2022-10-28 DIAGNOSIS — Z23 ENCOUNTER FOR IMMUNIZATION: ICD-10-CM

## 2022-10-28 DIAGNOSIS — R35.1 NOCTURIA: ICD-10-CM

## 2022-10-28 DIAGNOSIS — E78.5 DYSLIPIDEMIA: ICD-10-CM

## 2022-10-28 DIAGNOSIS — I10 PRIMARY HYPERTENSION: ICD-10-CM

## 2022-10-28 DIAGNOSIS — I63.312 CEREBROVASCULAR ACCIDENT (CVA) DUE TO THROMBOSIS OF LEFT MIDDLE CEREBRAL ARTERY (HCC): ICD-10-CM

## 2022-10-28 PROCEDURE — 90471 IMMUNIZATION ADMIN: CPT | Performed by: INTERNAL MEDICINE

## 2022-10-28 PROCEDURE — 3078F DIAST BP <80 MM HG: CPT | Performed by: INTERNAL MEDICINE

## 2022-10-28 PROCEDURE — 3074F SYST BP LT 130 MM HG: CPT | Performed by: INTERNAL MEDICINE

## 2022-10-28 PROCEDURE — 99214 OFFICE O/P EST MOD 30 MIN: CPT | Performed by: INTERNAL MEDICINE

## 2022-10-28 PROCEDURE — 90750 HZV VACC RECOMBINANT IM: CPT | Performed by: INTERNAL MEDICINE

## 2022-10-28 PROCEDURE — 3052F HG A1C>EQUAL 8.0%<EQUAL 9.0%: CPT | Performed by: INTERNAL MEDICINE

## 2022-10-28 NOTE — PROGRESS NOTES
Galo De Dios is a 57 y.o. male      Chief Complaint   Patient presents with    Diabetes    Hypertension     1. Have you been to the ER, urgent care clinic since your last visit? Hospitalized since your last visit? No    2. Have you seen or consulted any other health care providers outside of the [address] [place] [address] since your last visit? Include any pap smears or colon screening.  No

## 2022-10-28 NOTE — PROGRESS NOTES
SPORTS MEDICINE AND PRIMARY CARE  Patrick Cronin MD, [address] [facility],[address] [address]  Phone:  [phone]  Fax: [phone]       Chief Complaint   Patient presents with    Diabetes    Hypertension   . SUBJECTIVE:    Niya Mckeon is a 57 y.o. male Patient returns today with a history of CVA, diabetes, hypertension, dyslipidemia and is seen for evaluation. Patient returns today with his journal on a daily basis of his pulse, blood pressure and blood sugar readings, which are all reviewed and majority of it are completely acceptable. He is on 16 units and appears to be doing very well. He is without any complaints and is seen for evaluation. Current Outpatient Medications   Medication Sig Dispense Refill    metFORMIN ER (GLUCOPHAGE XR) 500 mg tablet TAKE 2 TABLETS BY MOUTH TWICE A DAY WITH FOOD 360 Tablet 2    sildenafil citrate (VIAGRA) 100 mg tablet TAKE 1 TABLET BY MOUTH APPROX 1 HOUR PRIOR TO SEXUAL ACTIVITY. MAX 1 PER DAY 6 Tablet 11    valsartan-hydroCHLOROthiazide (DIOVAN-HCT) 160-12.5 mg per tablet TAKE 1 TABLET BY MOUTH EVERY DAY 90 Tablet 3    metoprolol succinate (TOPROL-XL) 50 mg XL tablet TAKE 1 TABLET BY MOUTH EVERY DAY AT NIGHT 90 Tablet 3    amLODIPine (NORVASC) 5 mg tablet TAKE 1 TABLET BY MOUTH EVERY DAY 90 Tablet 3    atorvastatin (LIPITOR) 40 mg tablet TAKE 1 TABLET BY MOUTH EVERY DAY 90 Tablet 3    insulin NPH/insulin regular (NOVOLIN 70/30, HUMULIN 70/30) 100 unit/mL (70-30) injection 16 Units by SubCUTAneous route Daily (before breakfast). 15 mL 11    aspirin 81 mg chewable tablet Take 1 Tab by mouth daily.  100 Tab 11     Past Medical History:   Diagnosis Date    Anemia     Anemia     DM (diabetes mellitus) (HCC)     HTN (hypertension)     Onychomycosis     Preventative health care     Right knee gives way     S/P colonoscopy 2021    chil - repeat 10 yrs    Scrotal wall abscess     Stroke ([address] 11/29/2017    l hemiparesis     Past Surgical History: Procedure Laterality Date    HX PROSTATE SURGERY       No Known Allergies      REVIEW OF SYSTEMS:  General: negative for - chills or fever  ENT: negative for - headaches, nasal congestion or tinnitus  Respiratory: negative for - cough, hemoptysis, shortness of breath or wheezing  Cardiovascular : negative for - chest pain, edema, palpitations or shortness of breath  Gastrointestinal: negative for - abdominal pain, blood in stools, heartburn or nausea/vomiting  Genito-Urinary: no dysuria, trouble voiding, or hematuria  Musculoskeletal: negative for - gait disturbance, joint pain, joint stiffness or joint swelling  Neurological: no TIA or stroke symptoms  Hematologic: no bruises, no bleeding, no swollen glands  Integument: no lumps, mole changes, nail changes or rash  Endocrine: no malaise/lethargy or unexpected weight changes      Social History     Socioeconomic History    Marital status:    Tobacco Use    Smoking status: Never    Smokeless tobacco: Never   Vaping Use    Vaping Use: Never used   Substance and Sexual Activity    Alcohol use: No     Comment: occassionally    Drug use: No    Sexual activity: Yes     Partners: Female     Birth control/protection: None   Social History Narrative    ** Merged History Encounter **     Habits: There is no history of cigarette abuse, occasional alcohol use. No drug abuse. Social History:  Patient is  15 - Nikolai Boys. He has no children and another person stays with him. 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 Mother was 76 and complications of diabetes. He has two brothers and one sister who are alive and well.      Family History   Problem Relation Age of Onset    Diabetes Mother     Heart Disease Father        OBJECTIVE:    Visit Vitals  /77 (BP 1 Location: Left upper arm, BP Patient Position: Sitting)   Pulse 77   Temp 98.2 °F (36.8 °C) (Oral)   Resp 18   Ht 5' 10\" (1.778 m)   Wt 247 lb 1.6 oz (112.1 kg)   SpO2 99%   BMI 35.46 kg/m²     CONSTITUTIONAL: well , well nourished, appears age appropriate  EYES: perrla, eom intact  ENMT:moist mucous membranes, pharynx clear  NECK: supple. Thyroid normal  RESPIRATORY: Chest: clear bilaterally   CARDIOVASCULAR: Heart: regular rate and rhythm  GASTROINTESTINAL: Abdomen: soft, bowel sounds active  HEMATOLOGIC: no pathological lymph nodes palpated  MUSCULOSKELETAL: Extremities: no edema, pulse 1+   INTEGUMENT: No unusual rashes or suspicious skin lesions noted. Nails appear normal.  NEUROLOGIC: non-focal exam   MENTAL STATUS: alert and oriented, appropriate affect           ASSESSMENT:  1. Type 2 diabetes with nephropathy ([address]    2. Primary hypertension    3. Anemia, unspecified type    4. Cerebrovascular accident (CVA) due to thrombosis of left middle cerebral artery ([address]    5. Severe obesity ([address]    6. Onychomycosis    7. Dyslipidemia    8. Nocturia      We suspect his blood sugar control is excellent. We will check hemoglobin A1c for confirmation. Blood pressure control is also very good, and no adjustments are needed. He has a history of anemia. We will check CBC today. History of CVA with thrombosis of the left middle cerebral artery, but he is working and he is functioning completely normally. Obesity remains a concern. He has lost two pounds since we last saw him. We hope he will continue that trend. Onychomycosis is stable. He will be back to see me in four months sooner if he has any problems. We will wish him well. I have discussed the diagnosis with the patient and the intended plan as seen in the  Orders. The patient understands and agees with the plan. The patient has   received an after visit summary and questions were answered concerning  future plans  Patient labs and/or xrays were reviewed  Past records were reviewed.     PLAN:  .  Orders Placed This Encounter URINALYSIS W/ RFLX MICROSCOPIC    CBC WITH AUTOMATED DIFF    METABOLIC PANEL, COMPREHENSIVE    LIPID PANEL    PROSTATE SPECIFIC AG    HEMOGLOBIN A1C WITH EAG       Follow-up and Dispositions    Return in about 4 months (around 2/28/2023). ATTENTION:   This medical record was transcribed using an electronic medical records system. Although proofread, it may and can contain electronic and spelling errors. Other human spelling and other errors may be present. Corrections may be executed at a later time. Please feel free to contact us for any clarifications as needed.

## 2022-12-11 RX ORDER — METFORMIN HYDROCHLORIDE 500 MG/1
TABLET, EXTENDED RELEASE ORAL
Qty: 360 TABLET | Refills: 2 | Status: SHIPPED | OUTPATIENT
Start: 2022-12-11

## 2022-12-31 RX ORDER — ATORVASTATIN CALCIUM 40 MG/1
TABLET, FILM COATED ORAL
Qty: 90 TABLET | Refills: 3 | Status: SHIPPED | OUTPATIENT
Start: 2022-12-31

## 2023-01-05 RX ORDER — ATORVASTATIN CALCIUM 40 MG/1
40 TABLET, FILM COATED ORAL DAILY
Qty: 90 TABLET | Refills: 3 | Status: SHIPPED | OUTPATIENT
Start: 2023-01-05

## 2023-01-08 RX ORDER — AMLODIPINE BESYLATE 5 MG/1
TABLET ORAL
Qty: 90 TABLET | Refills: 3 | Status: SHIPPED | OUTPATIENT
Start: 2023-01-08

## 2023-01-08 RX ORDER — METOPROLOL SUCCINATE 50 MG/1
TABLET, EXTENDED RELEASE ORAL
Qty: 90 TABLET | Refills: 3 | Status: SHIPPED | OUTPATIENT
Start: 2023-01-08

## 2023-02-27 RX ORDER — VALSARTAN AND HYDROCHLOROTHIAZIDE 160; 12.5 MG/1; MG/1
TABLET, FILM COATED ORAL
Qty: 90 TABLET | Refills: 3 | Status: SHIPPED | OUTPATIENT
Start: 2023-02-27 | End: 2023-03-03

## 2023-03-03 ENCOUNTER — OFFICE VISIT (OUTPATIENT)
Dept: INTERNAL MEDICINE CLINIC | Age: 63
End: 2023-03-03
Payer: COMMERCIAL

## 2023-03-03 VITALS
BODY MASS INDEX: 35.83 KG/M2 | WEIGHT: 250.3 LBS | DIASTOLIC BLOOD PRESSURE: 74 MMHG | HEART RATE: 73 BPM | SYSTOLIC BLOOD PRESSURE: 156 MMHG | RESPIRATION RATE: 18 BRPM | TEMPERATURE: 97.9 F | HEIGHT: 70 IN | OXYGEN SATURATION: 99 %

## 2023-03-03 DIAGNOSIS — E66.01 SEVERE OBESITY (HCC): ICD-10-CM

## 2023-03-03 DIAGNOSIS — I10 PRIMARY HYPERTENSION: Primary | ICD-10-CM

## 2023-03-03 DIAGNOSIS — E78.5 DYSLIPIDEMIA: ICD-10-CM

## 2023-03-03 DIAGNOSIS — E11.21 TYPE 2 DIABETES WITH NEPHROPATHY (HCC): ICD-10-CM

## 2023-03-03 DIAGNOSIS — R35.1 NOCTURIA: ICD-10-CM

## 2023-03-03 DIAGNOSIS — I63.312 CEREBROVASCULAR ACCIDENT (CVA) DUE TO THROMBOSIS OF LEFT MIDDLE CEREBRAL ARTERY (HCC): ICD-10-CM

## 2023-03-03 PROCEDURE — 3078F DIAST BP <80 MM HG: CPT | Performed by: INTERNAL MEDICINE

## 2023-03-03 PROCEDURE — 99214 OFFICE O/P EST MOD 30 MIN: CPT | Performed by: INTERNAL MEDICINE

## 2023-03-03 PROCEDURE — 3077F SYST BP >= 140 MM HG: CPT | Performed by: INTERNAL MEDICINE

## 2023-03-03 RX ORDER — VALSARTAN AND HYDROCHLOROTHIAZIDE 320; 12.5 MG/1; MG/1
1 TABLET, FILM COATED ORAL DAILY
Qty: 90 TABLET | Refills: 3 | Status: SHIPPED | OUTPATIENT
Start: 2023-03-03

## 2023-03-03 NOTE — PROGRESS NOTES
SPORTS MEDICINE AND PRIMARY CARE  Jose Lipscomb MD, [address] [address]  Phone:  [phone]  Fax: [phone]       Chief Complaint   Patient presents with    Diabetes    Hypertension   . SUBJECTIVE:    Lois Cuellar is a 57 y.o. male Patient returns today with a known history of diabetes, hypertension, CVA, dyslipidemia, obesity and is seen for evaluation. Denies new complaints. He brings his blood pressure and blood sugar readings in with him, which we review with him. 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 Patient is seen for evaluation. Current Outpatient Medications   Medication Sig Dispense Refill    valsartan-hydroCHLOROthiazide (DIOVAN-HCT) 320-12.5 mg per tablet Take 1 Tablet by mouth daily. 90 Tablet 3    metoprolol succinate (TOPROL-XL) 50 mg XL tablet TAKE 1 TABLET BY MOUTH EVERY DAY AT NIGHT 90 Tablet 3    amLODIPine (NORVASC) 5 mg tablet TAKE 1 TABLET BY MOUTH EVERY DAY 90 Tablet 3    atorvastatin (LIPITOR) 40 mg tablet Take 1 Tablet by mouth daily. 90 Tablet 3    metFORMIN ER (GLUCOPHAGE XR) 500 mg tablet TAKE 2 TABLETS BY MOUTH TWICE A DAY WITH FOOD 360 Tablet 2    sildenafil citrate (VIAGRA) 100 mg tablet TAKE 1 TABLET BY MOUTH APPROX 1 HOUR PRIOR TO SEXUAL ACTIVITY. MAX 1 PER DAY 6 Tablet 11    insulin NPH/insulin regular (NOVOLIN 70/30, HUMULIN 70/30) 100 unit/mL (70-30) injection 16 Units by SubCUTAneous route Daily (before breakfast). 15 mL 11    aspirin 81 mg chewable tablet Take 1 Tab by mouth daily.  100 Tab 11     Past Medical History:   Diagnosis Date    Anemia     Anemia     DM (diabetes mellitus) (HCC)     HTN (hypertension)     Onychomycosis     Preventative health care     Right knee gives way     S/P colonoscopy 2021    chil - repeat 10 yrs    Scrotal wall abscess     [address] [address] 11/29/2017    l hemiparesis     Past Surgical History:   Procedure Laterality Date    HX PROSTATE SURGERY       No Known Allergies      REVIEW OF SYSTEMS:  General: negative for - chills or fever  ENT: negative for - headaches, nasal congestion or tinnitus  Respiratory: negative for - cough, hemoptysis, shortness of breath or wheezing  Cardiovascular : negative for - chest pain, edema, palpitations or shortness of breath  Gastrointestinal: negative for - abdominal pain, blood in stools, heartburn or nausea/vomiting  Genito-Urinary: no dysuria, trouble voiding, or hematuria  Musculoskeletal: negative for - gait disturbance, joint pain, joint stiffness or joint swelling  Neurological: no TIA or stroke symptoms  Hematologic: no bruises, no bleeding, no swollen glands  Integument: no lumps, mole changes, nail changes or rash  Endocrine: no malaise/lethargy or unexpected weight changes      Social History     Socioeconomic History    Marital status:    Tobacco Use    Smoking status: Never    Smokeless tobacco: Never   Vaping Use    Vaping Use: Never used   Substance and Sexual Activity    Alcohol use: No     Comment: occassionally    Drug use: No    Sexual activity: Yes     Partners: Female     Birth control/protection: None   Social History Narrative    ** Merged History Encounter **     Habits: There is no history of cigarette abuse, occasional alcohol use. No drug abuse. Social History:  Patient is  15 - Juany Rogers. He has no children and another person stays with him. 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 Mother was 76 and complications of diabetes. He has two brothers and one sister who are alive and well.      Family History   Problem Relation Age of Onset    Diabetes Mother     Heart Disease Father        OBJECTIVE:    Visit Vitals  BP (!) 156/74 (BP 1 Location: Left upper arm, BP Patient Position: Sitting)   Pulse 73   Temp 97.9 °F (36.6 °C) (Oral)   Resp 18   Ht 5' 10\" (1.778 m)   Wt 250 lb 4.8 oz (113.5 kg)   SpO2 99%   BMI 35.91 kg/m²     CONSTITUTIONAL: well , well nourished, appears age appropriate  EYES: perrla, eom intact  ENMT:moist mucous membranes, pharynx clear  NECK: supple. Thyroid normal  RESPIRATORY: Chest: clear bilaterally   CARDIOVASCULAR: Heart: regular rate and rhythm  GASTROINTESTINAL: Abdomen: soft, bowel sounds active  HEMATOLOGIC: no pathological lymph nodes palpated  MUSCULOSKELETAL: Extremities: no edema, pulse 1+   INTEGUMENT: No unusual rashes or suspicious skin lesions noted. Nails appear normal.  NEUROLOGIC: non-focal exam   MENTAL STATUS: alert and oriented, appropriate affect           ASSESSMENT:  1. Primary hypertension    2. Cerebrovascular accident (CVA) due to thrombosis of left middle cerebral artery ([address]    3. Dyslipidemia    4. Severe obesity ([facility]    5. Type 2 diabetes with nephropathy ([facility] [address]    6. Nocturia       Dictation on: 03/03/2023  2:36 PM by: Latonya Sung     I have discussed the diagnosis with the patient and the intended plan as seen in the  Orders. The patient understands and agees with the plan. The patient has   received an after visit summary and questions were answered concerning  future plans  Patient labs and/or xrays were reviewed  Past records were reviewed. PLAN:  .  Orders Placed This Encounter    valsartan-hydroCHLOROthiazide (DIOVAN-HCT) 320-12.5 mg per tablet         Follow-up and Dispositions    Return in about 4 months (around 7/3/2023). ATTENTION:   This medical record was transcribed using an electronic medical records system. Although proofread, it may and can contain electronic and spelling errors. Other human spelling and other errors may be present. Corrections may be executed at a later time. Please feel free to contact us for any clarifications as needed.

## 2023-03-03 NOTE — PROGRESS NOTES
Jovana Ochoa is a 57 y.o. male    Chief Complaint   Patient presents with    Diabetes    Hypertension     1. Have you been to the ER, urgent care clinic since your last visit? Hospitalized since your last visit? No    2. Have you seen or consulted any other health care providers outside of the [address] [place] [address] since your last visit? Include any pap smears or colon screening.  No

## 2023-03-12 RX ORDER — SILDENAFIL 100 MG/1
TABLET, FILM COATED ORAL
Qty: 6 TABLET | Refills: 11 | Status: SHIPPED | OUTPATIENT
Start: 2023-03-12

## 2023-08-04 ENCOUNTER — OFFICE VISIT (OUTPATIENT)
Facility: CLINIC | Age: 63
End: 2023-08-04
Payer: COMMERCIAL

## 2023-08-04 VITALS
HEIGHT: 70 IN | DIASTOLIC BLOOD PRESSURE: 70 MMHG | OXYGEN SATURATION: 99 % | WEIGHT: 252.9 LBS | HEART RATE: 76 BPM | SYSTOLIC BLOOD PRESSURE: 151 MMHG | TEMPERATURE: 97.8 F | BODY MASS INDEX: 36.2 KG/M2 | RESPIRATION RATE: 18 BRPM

## 2023-08-04 DIAGNOSIS — I63.9 CEREBROVASCULAR ACCIDENT (CVA), UNSPECIFIED MECHANISM (HCC): ICD-10-CM

## 2023-08-04 DIAGNOSIS — R35.1 NOCTURIA: ICD-10-CM

## 2023-08-04 DIAGNOSIS — E66.01 SEVERE OBESITY (HCC): ICD-10-CM

## 2023-08-04 DIAGNOSIS — E78.5 DYSLIPIDEMIA: Primary | ICD-10-CM

## 2023-08-04 DIAGNOSIS — E11.21 TYPE 2 DIABETES WITH NEPHROPATHY (HCC): ICD-10-CM

## 2023-08-04 PROCEDURE — 99214 OFFICE O/P EST MOD 30 MIN: CPT | Performed by: INTERNAL MEDICINE

## 2023-08-04 PROCEDURE — 3078F DIAST BP <80 MM HG: CPT | Performed by: INTERNAL MEDICINE

## 2023-08-04 PROCEDURE — 1123F ACP DISCUSS/DSCN MKR DOCD: CPT | Performed by: INTERNAL MEDICINE

## 2023-08-04 PROCEDURE — 3077F SYST BP >= 140 MM HG: CPT | Performed by: INTERNAL MEDICINE

## 2023-08-04 ASSESSMENT — ANXIETY QUESTIONNAIRES
IF YOU CHECKED OFF ANY PROBLEMS ON THIS QUESTIONNAIRE, HOW DIFFICULT HAVE THESE PROBLEMS MADE IT FOR YOU TO DO YOUR WORK, TAKE CARE OF THINGS AT HOME, OR GET ALONG WITH OTHER PEOPLE: NOT DIFFICULT AT ALL
4. TROUBLE RELAXING: 0
2. NOT BEING ABLE TO STOP OR CONTROL WORRYING: 0
6. BECOMING EASILY ANNOYED OR IRRITABLE: 0
1. FEELING NERVOUS, ANXIOUS, OR ON EDGE: 0
5. BEING SO RESTLESS THAT IT IS HARD TO SIT STILL: 0
GAD7 TOTAL SCORE: 0
3. WORRYING TOO MUCH ABOUT DIFFERENT THINGS: 0
7. FEELING AFRAID AS IF SOMETHING AWFUL MIGHT HAPPEN: 0

## 2023-08-04 ASSESSMENT — PATIENT HEALTH QUESTIONNAIRE - PHQ9
2. FEELING DOWN, DEPRESSED OR HOPELESS: 0
1. LITTLE INTEREST OR PLEASURE IN DOING THINGS: 0
SUM OF ALL RESPONSES TO PHQ QUESTIONS 1-9: 0
SUM OF ALL RESPONSES TO PHQ9 QUESTIONS 1 & 2: 0

## 2023-08-04 NOTE — PROGRESS NOTES
SPORTS MEDICINE AND PRIMARY CARE  Richard Tenorio MD, [place], [address] [place] [address]  Phone:  [phone]  Fax: [phone]       Chief Complaint   Patient presents with    3 Month Follow-Up   . SUBJECTIVE:    Katheen Apley is a 70 y.o. male  Dictation on: 08/04/2023  2:17 PM by: Troy Villatoro [[id number]]          Current Outpatient Medications   Medication Sig Dispense Refill    amLODIPine (NORVASC) 5 MG tablet Take 1 tablet by mouth daily      aspirin 81 MG chewable tablet Take 1 tablet by mouth daily      atorvastatin (LIPITOR) 40 MG tablet Take 1 tablet by mouth daily      insulin 70-30 (HUMULIN;NOVOLIN) (70-30) 100 UNIT per ML injection vial Inject 16 Units into the skin every morning (before breakfast)      metFORMIN (GLUCOPHAGE-XR) 500 MG extended release tablet TAKE 2 TABLETS BY MOUTH TWICE A DAY WITH FOOD      metoprolol succinate (TOPROL XL) 50 MG extended release tablet Take 1 tablet by mouth nightly      sildenafil (VIAGRA) 100 MG tablet TAKE 1 TABLET BY MOUTH APPROX 1 HOUR PRIOR TO SEXUAL ACTIVITY. MAX 1 PER DAY      valsartan-hydroCHLOROthiazide (DIOVAN-HCT) 320-12.5 MG per tablet Take 1 tablet by mouth daily       No current facility-administered medications for this visit.      Past Medical History:   Diagnosis Date    Anemia     Anemia     DM (diabetes mellitus) ([facility])     HTN (hypertension)     Onychomycosis     Preventative health care     Right knee gives way     S/P colonoscopy 2021    chil - repeat 10 yrs    Scrotal wall abscess     Stroke ([facility]) 11/29/2017    l hemiparesis     Past Surgical History:   Procedure Laterality Date    PROSTATE SURGERY       No Known Allergies      REVIEW OF SYSTEMS:  General: negative for - chills or fever  ENT: negative for - headaches, nasal congestion or tinnitus  Respiratory: negative for - cough, hemoptysis, shortness of breath or wheezing  Cardiovascular : negative for - chest pain, edema, palpitations or shortness of

## 2023-08-04 NOTE — PROGRESS NOTES
Katheen Apley is a 70 y.o. male    Chief Complaint   Patient presents with    3 Month Follow-Up     1. Have you been to the ER, urgent care clinic since your last visit? Hospitalized since your last visit? No    2. Have you seen or consulted any other health care providers outside of the [address] [place] [address] Avenue since your last visit? Include any pap smears or colon screening.  No

## 2023-09-11 RX ORDER — METFORMIN HYDROCHLORIDE 500 MG/1
TABLET, EXTENDED RELEASE ORAL
Qty: 360 TABLET | Refills: 3 | Status: SHIPPED | OUTPATIENT
Start: 2023-09-11

## 2024-01-05 RX ORDER — METOPROLOL SUCCINATE 50 MG/1
50 TABLET, EXTENDED RELEASE ORAL NIGHTLY
Qty: 90 TABLET | Refills: 3 | Status: SHIPPED | OUTPATIENT
Start: 2024-01-05

## 2024-01-05 RX ORDER — AMLODIPINE BESYLATE 5 MG/1
5 TABLET ORAL DAILY
Qty: 90 TABLET | Refills: 3 | Status: SHIPPED | OUTPATIENT
Start: 2024-01-05

## 2024-01-26 ENCOUNTER — OFFICE VISIT (OUTPATIENT)
Facility: CLINIC | Age: 64
End: 2024-01-26
Payer: MEDICARE

## 2024-01-26 VITALS
RESPIRATION RATE: 16 BRPM | HEIGHT: 72 IN | SYSTOLIC BLOOD PRESSURE: 133 MMHG | WEIGHT: 254.1 LBS | DIASTOLIC BLOOD PRESSURE: 81 MMHG | OXYGEN SATURATION: 97 % | TEMPERATURE: 97.9 F | HEART RATE: 83 BPM | BODY MASS INDEX: 34.42 KG/M2

## 2024-01-26 DIAGNOSIS — E11.21 TYPE 2 DIABETES WITH NEPHROPATHY (HCC): Primary | ICD-10-CM

## 2024-01-26 DIAGNOSIS — E66.09 CLASS 1 OBESITY DUE TO EXCESS CALORIES WITHOUT SERIOUS COMORBIDITY WITH BODY MASS INDEX (BMI) OF 34.0 TO 34.9 IN ADULT: ICD-10-CM

## 2024-01-26 DIAGNOSIS — I10 PRIMARY HYPERTENSION: ICD-10-CM

## 2024-01-26 DIAGNOSIS — R35.1 NOCTURIA: ICD-10-CM

## 2024-01-26 DIAGNOSIS — I63.9 CEREBROVASCULAR ACCIDENT (CVA), UNSPECIFIED MECHANISM (HCC): ICD-10-CM

## 2024-01-26 DIAGNOSIS — D64.9 ANEMIA, UNSPECIFIED TYPE: ICD-10-CM

## 2024-01-26 DIAGNOSIS — E78.5 DYSLIPIDEMIA: ICD-10-CM

## 2024-01-26 PROBLEM — E66.01 SEVERE OBESITY (HCC): Status: RESOLVED | Noted: 2019-06-20 | Resolved: 2024-01-26

## 2024-01-26 PROCEDURE — 3046F HEMOGLOBIN A1C LEVEL >9.0%: CPT | Performed by: INTERNAL MEDICINE

## 2024-01-26 PROCEDURE — 99214 OFFICE O/P EST MOD 30 MIN: CPT | Performed by: INTERNAL MEDICINE

## 2024-01-26 PROCEDURE — 1036F TOBACCO NON-USER: CPT | Performed by: INTERNAL MEDICINE

## 2024-01-26 PROCEDURE — 1123F ACP DISCUSS/DSCN MKR DOCD: CPT | Performed by: INTERNAL MEDICINE

## 2024-01-26 PROCEDURE — G8484 FLU IMMUNIZE NO ADMIN: HCPCS | Performed by: INTERNAL MEDICINE

## 2024-01-26 PROCEDURE — G8427 DOCREV CUR MEDS BY ELIG CLIN: HCPCS | Performed by: INTERNAL MEDICINE

## 2024-01-26 PROCEDURE — G8417 CALC BMI ABV UP PARAM F/U: HCPCS | Performed by: INTERNAL MEDICINE

## 2024-01-26 PROCEDURE — 36415 COLL VENOUS BLD VENIPUNCTURE: CPT | Performed by: INTERNAL MEDICINE

## 2024-01-26 PROCEDURE — 3075F SYST BP GE 130 - 139MM HG: CPT | Performed by: INTERNAL MEDICINE

## 2024-01-26 PROCEDURE — 3017F COLORECTAL CA SCREEN DOC REV: CPT | Performed by: INTERNAL MEDICINE

## 2024-01-26 PROCEDURE — 3079F DIAST BP 80-89 MM HG: CPT | Performed by: INTERNAL MEDICINE

## 2024-01-26 PROCEDURE — 2022F DILAT RTA XM EVC RTNOPTHY: CPT | Performed by: INTERNAL MEDICINE

## 2024-01-26 SDOH — ECONOMIC STABILITY: FOOD INSECURITY: WITHIN THE PAST 12 MONTHS, YOU WORRIED THAT YOUR FOOD WOULD RUN OUT BEFORE YOU GOT MONEY TO BUY MORE.: NEVER TRUE

## 2024-01-26 SDOH — ECONOMIC STABILITY: FOOD INSECURITY: WITHIN THE PAST 12 MONTHS, THE FOOD YOU BOUGHT JUST DIDN'T LAST AND YOU DIDN'T HAVE MONEY TO GET MORE.: NEVER TRUE

## 2024-01-26 SDOH — ECONOMIC STABILITY: HOUSING INSECURITY
IN THE LAST 12 MONTHS, WAS THERE A TIME WHEN YOU DID NOT HAVE A STEADY PLACE TO SLEEP OR SLEPT IN A SHELTER (INCLUDING NOW)?: NO

## 2024-01-26 SDOH — ECONOMIC STABILITY: INCOME INSECURITY: HOW HARD IS IT FOR YOU TO PAY FOR THE VERY BASICS LIKE FOOD, HOUSING, MEDICAL CARE, AND HEATING?: NOT HARD AT ALL

## 2024-01-26 ASSESSMENT — PATIENT HEALTH QUESTIONNAIRE - PHQ9
SUM OF ALL RESPONSES TO PHQ9 QUESTIONS 1 & 2: 0
SUM OF ALL RESPONSES TO PHQ QUESTIONS 1-9: 0
2. FEELING DOWN, DEPRESSED OR HOPELESS: 0
1. LITTLE INTEREST OR PLEASURE IN DOING THINGS: 0

## 2024-01-26 ASSESSMENT — ANXIETY QUESTIONNAIRES
3. WORRYING TOO MUCH ABOUT DIFFERENT THINGS: 0
7. FEELING AFRAID AS IF SOMETHING AWFUL MIGHT HAPPEN: 0
5. BEING SO RESTLESS THAT IT IS HARD TO SIT STILL: 0
4. TROUBLE RELAXING: 0
6. BECOMING EASILY ANNOYED OR IRRITABLE: 0
1. FEELING NERVOUS, ANXIOUS, OR ON EDGE: 0
IF YOU CHECKED OFF ANY PROBLEMS ON THIS QUESTIONNAIRE, HOW DIFFICULT HAVE THESE PROBLEMS MADE IT FOR YOU TO DO YOUR WORK, TAKE CARE OF THINGS AT HOME, OR GET ALONG WITH OTHER PEOPLE: NOT DIFFICULT AT ALL
GAD7 TOTAL SCORE: 0
2. NOT BEING ABLE TO STOP OR CONTROL WORRYING: 0

## 2024-01-26 NOTE — PROGRESS NOTES
Chief Complaint   Patient presents with    Follow-up    Diabetes    Hypertension     \"Have you been to the ER, urgent care clinic since your last visit?  Hospitalized since your last visit?\"    NO    “Have you seen or consulted any other health care providers outside of [facility] since your last visit?”    NO    “Have you had a colorectal cancer screening such as a colonoscopy/FIT/Cologuard?    NO       
Occurrences:   1     Standing Expiration Date:   1/26/2025    Microalbumin / Creatinine Urine Ratio     Standing Status:   Future     Number of Occurrences:   1     Standing Expiration Date:   1/26/2025    Hemoglobin A1C     Standing Status:   Future     Number of Occurrences:   1     Standing Expiration Date:   1/26/2025    PSA, Diagnostic     Standing Status:   Future     Number of Occurrences:   1     Standing Expiration Date:   1/26/2025    Urinalysis with Microscopic     Standing Status:   Future     Number of Occurrences:   1     Standing Expiration Date:   1/26/2025     Order Specific Question:   SPECIFY(EX-CATH,MIDSTREAM,CYSTO,ETC)?     Answer:   ms    Lipid Panel     Standing Status:   Future     Number of Occurrences:   1     Standing Expiration Date:   1/26/2025    Comprehensive Metabolic Panel     Standing Status:   Future     Number of Occurrences:   1     Standing Expiration Date:   1/26/2025    CBC with Auto Differential     Standing Status:   Future     Number of Occurrences:   1     Standing Expiration Date:   1/26/2025    UT COLLECTION VENOUS BLOOD VENIPUNCTURE        Follow-up and Dispositions    Return in about 4 months (around 5/26/2024).                ATTENTION:   This medical record was transcribed using an electronic medical records system.  Although proofread, it may and can contain electronic and spelling errors.  Other human spelling and other errors may be present.  Corrections may be executed at a later time.  Please feel free to contact us for any clarifications as needed.

## 2024-01-27 LAB
ALBUMIN SERPL-MCNC: 4.5 G/DL (ref 3.5–5)
ALBUMIN/GLOB SERPL: 1.6 (ref 1.1–2.2)
ALP SERPL-CCNC: 54 U/L (ref 45–117)
ALT SERPL-CCNC: 26 U/L (ref 12–78)
ANION GAP SERPL CALC-SCNC: 6 MMOL/L (ref 5–15)
APPEARANCE UR: CLEAR
AST SERPL-CCNC: 17 U/L (ref 15–37)
BACTERIA URNS QL MICRO: NEGATIVE /HPF
BASOPHILS # BLD: 0.1 K/UL (ref 0–0.1)
BASOPHILS NFR BLD: 1 % (ref 0–1)
BILIRUB SERPL-MCNC: 0.3 MG/DL (ref 0.2–1)
BILIRUB UR QL: NEGATIVE
BUN SERPL-MCNC: 19 MG/DL (ref 6–20)
BUN/CREAT SERPL: 17 (ref 12–20)
CALCIUM SERPL-MCNC: 10 MG/DL (ref 8.5–10.1)
CHLORIDE SERPL-SCNC: 108 MMOL/L (ref 97–108)
CHOLEST SERPL-MCNC: 128 MG/DL
CO2 SERPL-SCNC: 30 MMOL/L (ref 21–32)
COLOR UR: ABNORMAL
CREAT SERPL-MCNC: 1.14 MG/DL (ref 0.7–1.3)
CREAT UR-MCNC: 295 MG/DL
DIFFERENTIAL METHOD BLD: ABNORMAL
EOSINOPHIL # BLD: 0.1 K/UL (ref 0–0.4)
EOSINOPHIL NFR BLD: 1 % (ref 0–7)
EPITH CASTS URNS QL MICRO: ABNORMAL /LPF
ERYTHROCYTE [DISTWIDTH] IN BLOOD BY AUTOMATED COUNT: 17.6 % (ref 11.5–14.5)
EST. AVERAGE GLUCOSE BLD GHB EST-MCNC: 146 MG/DL
GLOBULIN SER CALC-MCNC: 2.9 G/DL (ref 2–4)
GLUCOSE SERPL-MCNC: 104 MG/DL (ref 65–100)
GLUCOSE UR STRIP.AUTO-MCNC: NEGATIVE MG/DL
HBA1C MFR BLD: 6.7 % (ref 4–5.6)
HCT VFR BLD AUTO: 39.7 % (ref 36.6–50.3)
HDLC SERPL-MCNC: 54 MG/DL
HDLC SERPL: 2.4 (ref 0–5)
HGB BLD-MCNC: 12 G/DL (ref 12.1–17)
HGB UR QL STRIP: NEGATIVE
HIV 1+2 AB+HIV1 P24 AG SERPL QL IA: NONREACTIVE
HIV 1/2 RESULT COMMENT: NORMAL
HYALINE CASTS URNS QL MICRO: ABNORMAL /LPF (ref 0–5)
IMM GRANULOCYTES # BLD AUTO: 0 K/UL (ref 0–0.04)
IMM GRANULOCYTES NFR BLD AUTO: 0 % (ref 0–0.5)
KETONES UR QL STRIP.AUTO: ABNORMAL MG/DL
LDLC SERPL CALC-MCNC: 42 MG/DL (ref 0–100)
LEUKOCYTE ESTERASE UR QL STRIP.AUTO: NEGATIVE
LYMPHOCYTES # BLD: 1.8 K/UL (ref 0.8–3.5)
LYMPHOCYTES NFR BLD: 29 % (ref 12–49)
MCH RBC QN AUTO: 22.9 PG (ref 26–34)
MCHC RBC AUTO-ENTMCNC: 30.2 G/DL (ref 30–36.5)
MCV RBC AUTO: 75.6 FL (ref 80–99)
MICROALBUMIN UR-MCNC: 37.3 MG/DL
MICROALBUMIN/CREAT UR-RTO: 126 MG/G (ref 0–30)
MONOCYTES # BLD: 0.6 K/UL (ref 0–1)
MONOCYTES NFR BLD: 9 % (ref 5–13)
NEUTS SEG # BLD: 3.7 K/UL (ref 1.8–8)
NEUTS SEG NFR BLD: 59 % (ref 32–75)
NITRITE UR QL STRIP.AUTO: NEGATIVE
NRBC # BLD: 0 K/UL (ref 0–0.01)
NRBC BLD-RTO: 0 PER 100 WBC
PH UR STRIP: 5.5 (ref 5–8)
PLATELET # BLD AUTO: 214 K/UL (ref 150–400)
POTASSIUM SERPL-SCNC: 4.3 MMOL/L (ref 3.5–5.1)
PROT SERPL-MCNC: 7.4 G/DL (ref 6.4–8.2)
PROT UR STRIP-MCNC: 30 MG/DL
PSA SERPL-MCNC: 1.1 NG/ML (ref 0.01–4)
RBC # BLD AUTO: 5.25 M/UL (ref 4.1–5.7)
RBC #/AREA URNS HPF: ABNORMAL /HPF (ref 0–5)
SODIUM SERPL-SCNC: 144 MMOL/L (ref 136–145)
SP GR UR REFRACTOMETRY: >1.03
TRIGL SERPL-MCNC: 160 MG/DL
UROBILINOGEN UR QL STRIP.AUTO: 0.2 EU/DL (ref 0.2–1)
VLDLC SERPL CALC-MCNC: 32 MG/DL
WBC # BLD AUTO: 6.1 K/UL (ref 4.1–11.1)
WBC URNS QL MICRO: ABNORMAL /HPF (ref 0–4)

## 2024-02-28 RX ORDER — VALSARTAN AND HYDROCHLOROTHIAZIDE 320; 12.5 MG/1; MG/1
1 TABLET, FILM COATED ORAL DAILY
Qty: 90 TABLET | Refills: 3 | Status: SHIPPED | OUTPATIENT
Start: 2024-02-28

## 2024-02-29 RX ORDER — ATORVASTATIN CALCIUM 40 MG/1
TABLET, FILM COATED ORAL
Qty: 90 TABLET | Refills: 3 | Status: SHIPPED | OUTPATIENT
Start: 2024-02-29

## 2024-02-29 RX ORDER — ATORVASTATIN CALCIUM 40 MG/1
40 TABLET, FILM COATED ORAL DAILY
Qty: 90 TABLET | Refills: 3 | Status: SHIPPED | OUTPATIENT
Start: 2024-02-29

## 2024-03-08 DIAGNOSIS — E66.09 CLASS 1 OBESITY DUE TO EXCESS CALORIES WITHOUT SERIOUS COMORBIDITY WITH BODY MASS INDEX (BMI) OF 34.0 TO 34.9 IN ADULT: ICD-10-CM

## 2024-03-08 DIAGNOSIS — E66.9 OBESITY (BMI 30.0-34.9): ICD-10-CM

## 2024-03-08 DIAGNOSIS — E11.65 TYPE 2 DIABETES MELLITUS WITH HYPERGLYCEMIA, WITH LONG-TERM CURRENT USE OF INSULIN (HCC): Primary | ICD-10-CM

## 2024-03-08 DIAGNOSIS — E11.21 TYPE 2 DIABETES WITH NEPHROPATHY (HCC): ICD-10-CM

## 2024-03-08 DIAGNOSIS — Z79.4 TYPE 2 DIABETES MELLITUS WITH HYPERGLYCEMIA, WITH LONG-TERM CURRENT USE OF INSULIN (HCC): Primary | ICD-10-CM

## 2024-03-08 PROBLEM — N52.8 OTHER MALE ERECTILE DYSFUNCTION: Status: ACTIVE | Noted: 2024-03-08

## 2024-03-08 RX ORDER — SILDENAFIL 100 MG/1
TABLET, FILM COATED ORAL
Qty: 30 TABLET | Refills: 5 | Status: SHIPPED | OUTPATIENT
Start: 2024-03-08

## 2024-03-08 NOTE — TELEPHONE ENCOUNTER
Last OV 01/26/24  Put in Prior Authorization for Mounjaro CoverMyMeds KEY: [id number]  Put in Prior Authorization for Sildenafil  CoverMyMeds KEY: [id number]

## 2024-06-07 ENCOUNTER — OFFICE VISIT (OUTPATIENT)
Facility: CLINIC | Age: 64
End: 2024-06-07

## 2024-06-07 VITALS
HEART RATE: 71 BPM | WEIGHT: 255.7 LBS | RESPIRATION RATE: 16 BRPM | OXYGEN SATURATION: 99 % | HEIGHT: 72 IN | SYSTOLIC BLOOD PRESSURE: 134 MMHG | BODY MASS INDEX: 34.63 KG/M2 | DIASTOLIC BLOOD PRESSURE: 70 MMHG | TEMPERATURE: 97.8 F

## 2024-06-07 DIAGNOSIS — E11.21 TYPE 2 DIABETES WITH NEPHROPATHY (HCC): ICD-10-CM

## 2024-06-07 DIAGNOSIS — Z86.73 HISTORY OF RIGHT MCA STROKE: ICD-10-CM

## 2024-06-07 DIAGNOSIS — E66.9 OBESITY (BMI 30.0-34.9): ICD-10-CM

## 2024-06-07 DIAGNOSIS — I10 PRIMARY HYPERTENSION: ICD-10-CM

## 2024-06-07 DIAGNOSIS — E78.5 DYSLIPIDEMIA: ICD-10-CM

## 2024-06-07 DIAGNOSIS — Z00.00 MEDICARE ANNUAL WELLNESS VISIT, SUBSEQUENT: Primary | ICD-10-CM

## 2024-06-07 PROBLEM — E66.811 OBESITY (BMI 30.0-34.9): Status: RESOLVED | Noted: 2019-06-20 | Resolved: 2024-01-26

## 2024-06-07 ASSESSMENT — PATIENT HEALTH QUESTIONNAIRE - PHQ9
1. LITTLE INTEREST OR PLEASURE IN DOING THINGS: NOT AT ALL
SUM OF ALL RESPONSES TO PHQ QUESTIONS 1-9: 0
2. FEELING DOWN, DEPRESSED OR HOPELESS: NOT AT ALL
SUM OF ALL RESPONSES TO PHQ QUESTIONS 1-9: 0
SUM OF ALL RESPONSES TO PHQ9 QUESTIONS 1 & 2: 0

## 2024-06-07 ASSESSMENT — LIFESTYLE VARIABLES
HOW OFTEN DO YOU HAVE A DRINK CONTAINING ALCOHOL: NEVER
HOW MANY STANDARD DRINKS CONTAINING ALCOHOL DO YOU HAVE ON A TYPICAL DAY: PATIENT DOES NOT DRINK

## 2024-06-07 NOTE — PATIENT INSTRUCTIONS
lifestyle    If your doctor recommends it, get more exercise. For many people, walking is a good choice. Or you may want to swim, bike, or do other activities. Bit by bit, increase the time you're active every day. Try for at least 30 minutes on most days of the week.     Try to quit or cut back on using tobacco and other nicotine products. This includes smoking and vaping. If you need help quitting, talk to your doctor about stop-smoking programs and medicines. These can increase your chances of quitting for good. Quitting is one of the most important things you can do to protect your heart. It is never too late to quit. Try to avoid secondhand smoke too.     Stay at a weight that's healthy for you. Talk to your doctor if you need help losing weight.     Try to get 7 to 9 hours of sleep each night.     Limit alcohol to 2 drinks a day for men and 1 drink a day for women. Too much alcohol can cause health problems.     Manage other health problems such as diabetes, high blood pressure, and high cholesterol. If you think you may have a problem with alcohol or drug use, talk to your doctor.   Medicines    Take your medicines exactly as prescribed. Call your doctor if you think you are having a problem with your medicine.     If your doctor recommends aspirin, take the amount directed each day. Make sure you take aspirin and not another kind of pain reliever, such as acetaminophen (Tylenol).   When should you call for help?   Call 911 if you have symptoms of a heart attack. These may include:    Chest pain or pressure, or a strange feeling in the chest.     Sweating.     Shortness of breath.     Pain, pressure, or a strange feeling in the back, neck, jaw, or upper belly or in one or both shoulders or arms.     Lightheadedness or sudden weakness.     A fast or irregular heartbeat.   After you call 911, the  may tell you to chew 1 adult-strength or 2 to 4 low-dose aspirin. Wait for an ambulance. Do not try to

## 2024-06-07 NOTE — PROGRESS NOTES
Chief Complaint   Patient presents with    Medicare AWV     \"Have you been to the ER, urgent care clinic since your last visit?  Hospitalized since your last visit?\"    NO    “Have you seen or consulted any other health care providers outside of [facility] since your last visit?”    NO        “Have you had a colorectal cancer screening such as a colonoscopy/FIT/Cologuard?    YES - Type: Colonoscopy - Where: [facility]  Nurse/CMA to request most recent records if not in the chart     No colonoscopy on file  No cologuard on file  No FIT/FOBT on file   No flexible sigmoidoscopy on file         Click Here for Release of Records Request  
Medicare Annual Wellness Visit    Joey Arizmendi is here for Medicare AWV    Assessment & Plan   Medicare annual wellness visit, subsequent  Recommendations for Preventive Services Due: see orders and patient instructions/AVS.  Recommended screening schedule for the next 5-10 years is provided to the patient in written form: see Patient Instructions/AVS.     No follow-ups on file.     Subjective       Patient's complete Health Risk Assessment and screening values have been reviewed and are found in Flowsheets. The following problems were reviewed today and where indicated follow up appointments were made and/or referrals ordered.    Positive Risk Factor Screenings with Interventions:                Activity, Diet, and Weight:  On average, how many days per week do you engage in moderate to strenuous exercise (like a brisk walk)?: 0 days  On average, how many minutes do you engage in exercise at this level?: 0 min    Do you eat balanced/healthy meals regularly?: Yes    Body mass index is 34.68 kg/m². (!) Abnormal      Inactivity Interventions:  See A/P for plan and any pertinent orders  Obesity Interventions:  See A/P for plan and any pertinent orders            Dentist Screen:  Have you seen the dentist within the past year?: (!) No    Intervention:  See A/P for any pertinent orders      Safety:  Do you always fasten your seatbelt when you are in a car?: (!) No  Interventions:  See A/P for plan and any pertinent orders     Advanced Directives:  Do you have a Living Will?: (!) No    Intervention:  has NO advanced directive - information provided                     Objective   Vitals:    06/07/24 1431   BP: 134/70   Site: Left Upper Arm   Position: Sitting   Pulse: 71   Resp: 16   Temp: 97.8 °F (36.6 °C)   TempSrc: Oral   SpO2: 99%   Weight: 116 kg (255 lb 11.2 oz)   Height: 1.829 m (6')      Body mass index is 34.68 kg/m².               No Known Allergies  Prior to Visit Medications    Medication Sig Taking? 
Obesity (BMI 30.0-34.9)    4. Primary hypertension    5. History of right MCA stroke    6. Dyslipidemia       Dictation on: 06/07/2024  3:08 PM by: BRE DALEY [[id number]]     I have discussed the diagnosis with the patient and the intended plan as seen in the  Orders.  The patient understands and agees with the plan.  The patient has   received an after visit summary and questions were answered concerning  future plans  Patient labs and/or xrays were reviewed  Past records were reviewed.    PLAN:  Orders Placed This Encounter   Procedures    Hemoglobin A1C     Standing Status:   Future     Number of Occurrences:   1     Standing Expiration Date:   6/7/2025    FULL CODE    VT COLLECTION VENOUS BLOOD VENIPUNCTURE    HM DIABETES FOOT EXAM                   ATTENTION:   This medical record was transcribed using an electronic medical records system.  Although proofread, it may and can contain electronic and spelling errors.  Other human spelling and other errors may be present.  Corrections may be executed at a later time.  Please feel free to contact us for any clarifications as needed.

## 2024-06-08 LAB
EST. AVERAGE GLUCOSE BLD GHB EST-MCNC: 148 MG/DL
HBA1C MFR BLD: 6.8 % (ref 4–5.6)

## 2024-07-16 NOTE — PROGRESS NOTES
SPORTS MEDICINE AND PRIMARY CARE  Danielle Gleason MD, [address] [address]  Phone:  [phone]  Fax: [phone]       Chief Complaint   Patient presents with    Hypertension   . SUBJECTIVE:    Karen Lutz is a 56 y.o. male Patient returns today with a known history of type 2 diabetes with nephropathy, CVA, obesity, anemia, primary hypertension, and is seen for evaluation. Current Outpatient Medications   Medication Sig Dispense Refill    valsartan-hydroCHLOROthiazide (DIOVAN-HCT) 160-12.5 mg per tablet Take 1 Tab by mouth daily. 90 Tab 3    amLODIPine (NORVASC) 5 mg tablet TAKE 1 TABLET BY MOUTH EVERY DAY 90 Tab 3    metoprolol succinate (TOPROL-XL) 50 mg XL tablet TAKE 1 TABLET BY MOUTH NIGHTLY 90 Tab 3    sildenafil citrate (VIAGRA) 100 mg tablet TAKE 1 TABLET BY MOUTH APPROX 1 HOUR PRIOR TO SEXUAL ACTIVITY. MAX 1 PER DAY 6 Tab 11    atorvastatin (LIPITOR) 40 mg tablet TAKE 1 TABLET BY MOUTH EVERY DAY 90 Tab 3    metFORMIN ER (GLUCOPHAGE XR) 500 mg tablet TAKE 2 TABLETS BY MOUTH TWICE A DAY WITH FOOD 360 Tab 2    insulin NPH/insulin regular (NOVOLIN 70/30, HUMULIN 70/30) 100 unit/mL (70-30) injection 8 Units by SubCUTAneous route Daily (before breakfast). 10 mL 11    aspirin 81 mg chewable tablet Take 1 Tab by mouth daily. 100 Tab 11     Past Medical History:   Diagnosis Date    Anemia     DM [facility]di[facility]     HTN (hypertension)     Preventative health care     Right knee gives way     Scrotal wall abscess     Stroke ([facility] [address] 11/29/2017    l hemiparesis     History reviewed. No pertinent surgical history.   No Known Allergies      REVIEW OF SYSTEMS:  General: negative for - chills or fever  ENT: negative for - headaches, nasal congestion or tinnitus  Respiratory: negative for - cough, hemoptysis, shortness of breath or wheezing  Cardiovascular : negative for - chest pain, edema, palpitations or shortness of breath  Gastrointestinal: Plan: Apply Sunscreen 35 SPF or greater daily to sun exposed areas. negative for - abdominal pain, blood in stools, heartburn or nausea/vomiting  Genito-Urinary: no dysuria, trouble voiding, or hematuria  Musculoskeletal: negative for - gait disturbance, joint pain, joint stiffness or joint swelling  Neurological: no TIA or stroke symptoms  Hematologic: no bruises, no bleeding, no swollen glands  Integument: no lumps, mole changes, nail changes or rash  Endocrine: no malaise/lethargy or unexpected weight changes      Social History     Socioeconomic History    Marital status:      Spouse name: Not on file    Number of children: Not on file    Years of education: Not on file    Highest education level: Not on file   Tobacco Use    Smoking status: Never Smoker    Smokeless tobacco: Never Used   Substance and Sexual Activity    Alcohol use: No     Comment: occassionally    Drug use: No    Sexual activity: Yes     Partners: Female     Birth control/protection: None   Social History Narrative    ** Merged History Encounter **     Habits: There is no history of cigarette abuse, occasional alcohol use. No drug abuse.           Social History:  Patient is  15 - [facility] [place]. He has no children and another person stays with him. Patient is an LPN in Psychiatry at [address]         Family History:  Father  at 67 related to congestive heart failure. Mother was 76 and complications of diabetes. He has two brothers and one sister who are alive and well. Family History   Problem Relation Age of Onset    Diabetes Mother     Heart Disease Father        OBJECTIVE:    Visit Vitals  BP (!) 170/82   Pulse 83   Temp 98.1 °F (36.7 °C) (Oral)   Resp 18   Ht 5' 10\" (1.778 m)   Wt 254 lb 9.6 oz (115.5 kg)   SpO2 96%   BMI 36.53 kg/m²     CONSTITUTIONAL: well , well nourished, appears age appropriate  EYES: perrla, eom intact  ENMT:moist mucous membranes, pharynx clear  NECK: supple.  Thyroid normal  RESPIRATORY: Chest: clear bilaterally CARDIOVASCULAR: Heart: regular rate and rhythm  GASTROINTESTINAL: Abdomen: soft, bowel sounds active  HEMATOLOGIC: no pathological lymph nodes palpated  MUSCULOSKELETAL: Extremities: no edema, pulse 1+   INTEGUMENT: No unusual rashes or suspicious skin lesions noted. Nails appear normal.  NEUROLOGIC: non-focal exam   MENTAL STATUS: alert and oriented, appropriate affect           ASSESSMENT:  1. Type 2 diabetes with nephropathy ([address]    2. Cerebrovascular accident (CVA) due to thrombosis of left middle cerebral artery ([address]    3. Severe obesity ([address]    4. Anemia, unspecified type    5. Essential hypertension      We will check his hemoglobin A1c for control of diabetes with nephropathy. He is on Metformin and insulin. His last A1c was 7.6, a little higher than we would like to see it. If he is still above 7.5, we suggest he increase his insulin. 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 He has a history of CVA and it is important he have all of his numbers normal to decrease the risk of a second one, and we note he is at high risk for a second one just because he had the first one. Obesity remains a challenge for him and since we last saw him he has gained another 2 pounds. Anemia has been stable. Blood pressure control is less than ideal. We will stop Lisinopril and place him on Diovan 160/12. 5. If the Diovan 160/12.5 does not get him to goal, he will send us a text with Kuli Kuli and we will increase to 320/12.5 because we want his blood pressure preferably in the 120s. He monitors it at home. He is very fastidious in taking care of his health. We offer COVID vaccine, he refuses. He will be back to see me in three to four months. I have discussed the diagnosis with the patient and the intended plan as seen in the  Orders. The patient understands and agees with the plan.   The patient has   received an after Detail Level: Zone visit summary and questions were answered concerning  future plans  Patient labs and/or xrays were reviewed  Past records were reviewed. PLAN:  .  Orders Placed This Encounter    HEMOGLOBIN A1C WITH EAG    RENAL FUNCTION PANEL    valsartan-hydroCHLOROthiazide (DIOVAN-HCT) 160-12.5 mg per tablet       Follow-up and Dispositions    · Return in about 4 months (around 7/5/2021). ATTENTION:   This medical record was transcribed using an electronic medical records system. Although proofread, it may and can contain electronic and spelling errors. Other human spelling and other errors may be present. Corrections may be executed at a later time. Please feel free to contact us for any clarifications as needed.

## 2024-08-30 RX ORDER — METFORMIN HCL 500 MG
TABLET, EXTENDED RELEASE 24 HR ORAL
Qty: 360 TABLET | Refills: 3 | Status: SHIPPED | OUTPATIENT
Start: 2024-08-30

## 2024-11-29 ENCOUNTER — OFFICE VISIT (OUTPATIENT)
Facility: CLINIC | Age: 64
End: 2024-11-29
Payer: MEDICARE

## 2024-11-29 VITALS
SYSTOLIC BLOOD PRESSURE: 120 MMHG | WEIGHT: 255 LBS | BODY MASS INDEX: 34.54 KG/M2 | TEMPERATURE: 98.3 F | HEART RATE: 76 BPM | HEIGHT: 72 IN | OXYGEN SATURATION: 98 % | RESPIRATION RATE: 16 BRPM | DIASTOLIC BLOOD PRESSURE: 60 MMHG

## 2024-11-29 DIAGNOSIS — I10 PRIMARY HYPERTENSION: ICD-10-CM

## 2024-11-29 DIAGNOSIS — E78.5 DYSLIPIDEMIA: ICD-10-CM

## 2024-11-29 DIAGNOSIS — Z86.73 HISTORY OF RIGHT MCA STROKE: ICD-10-CM

## 2024-11-29 DIAGNOSIS — E11.21 TYPE 2 DIABETES WITH NEPHROPATHY (HCC): Primary | ICD-10-CM

## 2024-11-29 PROCEDURE — 36415 COLL VENOUS BLD VENIPUNCTURE: CPT | Performed by: INTERNAL MEDICINE

## 2024-11-29 PROCEDURE — 99214 OFFICE O/P EST MOD 30 MIN: CPT | Performed by: INTERNAL MEDICINE

## 2024-11-29 PROCEDURE — 3078F DIAST BP <80 MM HG: CPT | Performed by: INTERNAL MEDICINE

## 2024-11-29 PROCEDURE — 1123F ACP DISCUSS/DSCN MKR DOCD: CPT | Performed by: INTERNAL MEDICINE

## 2024-11-29 PROCEDURE — 3044F HG A1C LEVEL LT 7.0%: CPT | Performed by: INTERNAL MEDICINE

## 2024-11-29 PROCEDURE — 3074F SYST BP LT 130 MM HG: CPT | Performed by: INTERNAL MEDICINE

## 2024-11-29 PROCEDURE — 1159F MED LIST DOCD IN RCRD: CPT | Performed by: INTERNAL MEDICINE

## 2024-11-29 NOTE — PROGRESS NOTES
Chief Complaint   Patient presents with    Diabetes     Patient is here for a follow up      \"Have you been to the ER, urgent care clinic since your last visit?  Hospitalized since your last visit?\"    NO    “Have you seen or consulted any other health care providers outside our system since your last visit?”    NO      “Have you had a colorectal cancer screening such as a colonoscopy/FIT/Cologuard?    NO    No colonoscopy on file  No cologuard on file  No FIT/FOBT on file   No flexible sigmoidoscopy on file         
the skin every morning (before breakfast)       No current facility-administered medications for this visit.     Past Medical History:   Diagnosis Date    Anemia     Anemia     DM (diabetes mellitus) (HCC)     HTN (hypertension)     Obesity (BMI 30.0-34.9) 06/20/2019    Onychomycosis     Preventative health care     Right knee gives way     S/P colonoscopy 2021    chil - repeat 10 yrs    Scrotal wall abscess     Stroke (HCC) 11/29/2017    l hemiparesis     Past Surgical History:   Procedure Laterality Date    PROSTATE SURGERY       No Known Allergies      REVIEW OF SYSTEMS:  General: negative for - chills or fever  ENT: negative for - headaches, nasal congestion or tinnitus  Respiratory: negative for - cough, hemoptysis, shortness of breath or wheezing  Cardiovascular : negative for - chest pain, edema, palpitations or shortness of breath  Gastrointestinal: negative for - abdominal pain, blood in stools, heartburn or nausea/vomiting  Genito-Urinary: no dysuria, trouble voiding, or hematuria  Musculoskeletal: negative for - gait disturbance, joint pain, joint stiffness or joint swelling  Neurological: no TIA or stroke symptoms  Hematologic: no bruises, no bleeding, no swollen glands  Integument: no lumps, mole changes, nail changes or rash  Endocrine: no malaise/lethargy or unexpected weight changes      Social History     Socioeconomic History    Marital status:      Spouse name: None    Number of children: None    Years of education: None    Highest education level: None   Tobacco Use    Smoking status: Never    Smokeless tobacco: Never   Substance and Sexual Activity    Alcohol use: No    Drug use: No    Sexual activity: Yes     Partners: Female     Birth control/protection: None   Social History Narrative    ** Merged History Encounter **   Habits:  There is no history of cigarette abuse, occasional alcohol use.  No drug abuse.               Social History:  Patient is  11/25[birth year]17 - [place].  He has

## 2024-12-02 LAB
EST. AVERAGE GLUCOSE BLD GHB EST-MCNC: 171 MG/DL
HBA1C MFR BLD: 7.6 % (ref 4–5.6)

## 2025-01-20 RX ORDER — ATORVASTATIN CALCIUM 40 MG/1
TABLET, FILM COATED ORAL
Qty: 90 TABLET | Refills: 3 | Status: SHIPPED | OUTPATIENT
Start: 2025-01-20

## 2025-01-20 RX ORDER — AMLODIPINE BESYLATE 5 MG/1
5 TABLET ORAL DAILY
Qty: 90 TABLET | Refills: 3 | Status: SHIPPED | OUTPATIENT
Start: 2025-01-20

## 2025-01-20 RX ORDER — SILDENAFIL 100 MG/1
TABLET, FILM COATED ORAL
Qty: 6 TABLET | Refills: 11 | Status: SHIPPED | OUTPATIENT
Start: 2025-01-20

## 2025-01-20 RX ORDER — VALSARTAN AND HYDROCHLOROTHIAZIDE 320; 12.5 MG/1; MG/1
1 TABLET, FILM COATED ORAL DAILY
Qty: 90 TABLET | Refills: 3 | Status: SHIPPED | OUTPATIENT
Start: 2025-01-20

## 2025-01-23 RX ORDER — METOPROLOL SUCCINATE 50 MG/1
TABLET, EXTENDED RELEASE ORAL
Qty: 90 TABLET | Refills: 3 | Status: SHIPPED | OUTPATIENT
Start: 2025-01-23

## 2025-04-11 ENCOUNTER — OFFICE VISIT (OUTPATIENT)
Facility: CLINIC | Age: 65
End: 2025-04-11

## 2025-04-11 VITALS
OXYGEN SATURATION: 95 % | BODY MASS INDEX: 33.64 KG/M2 | WEIGHT: 248.4 LBS | SYSTOLIC BLOOD PRESSURE: 137 MMHG | HEART RATE: 84 BPM | DIASTOLIC BLOOD PRESSURE: 75 MMHG | HEIGHT: 72 IN | TEMPERATURE: 98.1 F | RESPIRATION RATE: 16 BRPM

## 2025-04-11 DIAGNOSIS — R35.1 NOCTURIA: ICD-10-CM

## 2025-04-11 DIAGNOSIS — Z00.00 MEDICARE ANNUAL WELLNESS VISIT, SUBSEQUENT: Primary | ICD-10-CM

## 2025-04-11 DIAGNOSIS — I10 PRIMARY HYPERTENSION: ICD-10-CM

## 2025-04-11 DIAGNOSIS — Z86.73 HISTORY OF RIGHT MCA STROKE: ICD-10-CM

## 2025-04-11 DIAGNOSIS — E11.21 TYPE 2 DIABETES WITH NEPHROPATHY (HCC): ICD-10-CM

## 2025-04-11 DIAGNOSIS — E78.5 DYSLIPIDEMIA: ICD-10-CM

## 2025-04-11 DIAGNOSIS — E66.811 OBESITY (BMI 30.0-34.9): ICD-10-CM

## 2025-04-11 SDOH — ECONOMIC STABILITY: FOOD INSECURITY: WITHIN THE PAST 12 MONTHS, THE FOOD YOU BOUGHT JUST DIDN'T LAST AND YOU DIDN'T HAVE MONEY TO GET MORE.: NEVER TRUE

## 2025-04-11 SDOH — ECONOMIC STABILITY: FOOD INSECURITY: WITHIN THE PAST 12 MONTHS, YOU WORRIED THAT YOUR FOOD WOULD RUN OUT BEFORE YOU GOT MONEY TO BUY MORE.: NEVER TRUE

## 2025-04-11 ASSESSMENT — ANXIETY QUESTIONNAIRES
6. BECOMING EASILY ANNOYED OR IRRITABLE: NOT AT ALL
4. TROUBLE RELAXING: NOT AT ALL
7. FEELING AFRAID AS IF SOMETHING AWFUL MIGHT HAPPEN: NOT AT ALL
3. WORRYING TOO MUCH ABOUT DIFFERENT THINGS: NOT AT ALL
IF YOU CHECKED OFF ANY PROBLEMS ON THIS QUESTIONNAIRE, HOW DIFFICULT HAVE THESE PROBLEMS MADE IT FOR YOU TO DO YOUR WORK, TAKE CARE OF THINGS AT HOME, OR GET ALONG WITH OTHER PEOPLE: NOT DIFFICULT AT ALL
1. FEELING NERVOUS, ANXIOUS, OR ON EDGE: NOT AT ALL
GAD7 TOTAL SCORE: 0
5. BEING SO RESTLESS THAT IT IS HARD TO SIT STILL: NOT AT ALL
2. NOT BEING ABLE TO STOP OR CONTROL WORRYING: NOT AT ALL

## 2025-04-11 ASSESSMENT — PATIENT HEALTH QUESTIONNAIRE - PHQ9
SUM OF ALL RESPONSES TO PHQ QUESTIONS 1-9: 0
2. FEELING DOWN, DEPRESSED OR HOPELESS: NOT AT ALL
SUM OF ALL RESPONSES TO PHQ QUESTIONS 1-9: 0
1. LITTLE INTEREST OR PLEASURE IN DOING THINGS: NOT AT ALL

## 2025-04-11 NOTE — PROGRESS NOTES
Chief Complaint   Patient presents with    Medicare AWV     \"Have you been to the ER, urgent care clinic since your last visit?  Hospitalized since your last visit?\"    NO    “Have you seen or consulted any other health care providers outside our system since your last visit?”    NO      “Have you had a colorectal cancer screening such as a colonoscopy/FIT/Cologuard?    YES - [facility] recent records if not in the chart     No colonoscopy on file  No cologuard on file  No FIT/FOBT on file   No flexible sigmoidoscopy on file            
100 UNIT per ML injection vial Inject 16 Units into the skin every morning (before breakfast) Yes Automatic Reconciliation, Ar       CareTeam (Including outside providers/suppliers regularly involved in providing care):   Patient Care Team:  Grover Jones MD as PCP - General  Grover Jones MD as PCP - Empaneled Provider     Recommendations for Preventive Services Due: see orders and patient instructions/AVS.  Recommended screening schedule for the next 5-10 years is provided to the patient in written form: see Patient Instructions/AVS.     Reviewed and updated this visit:  Tobacco  Allergies  Meds  Sexual Hx

## 2025-04-11 NOTE — PATIENT INSTRUCTIONS
more?  Go to [email or web address]://[email or web address].net/patientEd and enter U357 to learn more about \"Starting a Weight-Loss Plan: Care Instructions.\"  Current as of: April 30, 2024  Content Version: 14.4  © 5569-5027 [employer].   Care instructions adapted under license by [employer]. If you have questions about a medical condition or this instruction, always ask your healthcare professional. [employer] disclaims any warranty or liability for your use of this information.         Advance Directives: Care Instructions  Overview  An advance directive is a legal way to state your wishes at the end of your life. It tells your family and your doctor what to do if you can't say what you want.  There are two main types of advance directives. You can change them any time your wishes change.  Living will.  This form tells your family and your doctor your wishes about life support and other treatment. The form is also called a declaration.  Medical power of .  This form lets you name a person to make treatment decisions for you when you can't speak for yourself. This person is called a health care agent (health care proxy, health care surrogate). The form is also called a durable power of  for health care.  If you do not have an advance directive, decisions about your medical care may be made by a family member, or by a doctor or a  who doesn't know you.  It may help to think of an advance directive as a gift to the people who care for you. If you have one, they won't have to make tough decisions by themselves.  For more information, including forms for your state, see the [employer] website (www.caringinfo.org/planning/advance-directives/).  Follow-up care is a key part of your treatment and safety. Be sure to make and go to all appointments, and call your doctor if you are having problems. It's also a good idea to know your test results and keep a list of the medicines you take.  What should

## 2025-08-20 RX ORDER — METFORMIN HYDROCHLORIDE 500 MG/1
TABLET, EXTENDED RELEASE ORAL
Qty: 360 TABLET | Refills: 3 | Status: SHIPPED | OUTPATIENT
Start: 2025-08-20